# Patient Record
Sex: FEMALE | Race: BLACK OR AFRICAN AMERICAN | NOT HISPANIC OR LATINO | Employment: UNEMPLOYED | URBAN - METROPOLITAN AREA
[De-identification: names, ages, dates, MRNs, and addresses within clinical notes are randomized per-mention and may not be internally consistent; named-entity substitution may affect disease eponyms.]

---

## 2017-01-29 ENCOUNTER — HOSPITAL ENCOUNTER (EMERGENCY)
Facility: HOSPITAL | Age: 22
Discharge: LEFT BEFORE DISCHARGE | End: 2017-01-29
Payer: MEDICAID

## 2017-01-29 VITALS
HEIGHT: 61 IN | TEMPERATURE: 98 F | BODY MASS INDEX: 28.32 KG/M2 | RESPIRATION RATE: 16 BRPM | OXYGEN SATURATION: 100 % | DIASTOLIC BLOOD PRESSURE: 67 MMHG | HEART RATE: 76 BPM | SYSTOLIC BLOOD PRESSURE: 143 MMHG | WEIGHT: 150 LBS

## 2017-01-29 DIAGNOSIS — O26.899 VAGINAL DISCHARGE DURING PREGNANCY, UNSPECIFIED TRIMESTER: ICD-10-CM

## 2017-01-29 DIAGNOSIS — Z34.90 PREGNANCY, UNSPECIFIED GESTATIONAL AGE: Primary | ICD-10-CM

## 2017-01-29 DIAGNOSIS — O26.899 PELVIC PAIN IN PREGNANCY: ICD-10-CM

## 2017-01-29 DIAGNOSIS — N89.8 VAGINAL DISCHARGE DURING PREGNANCY, UNSPECIFIED TRIMESTER: ICD-10-CM

## 2017-01-29 DIAGNOSIS — R10.2 PELVIC PAIN IN PREGNANCY: ICD-10-CM

## 2017-01-29 LAB
B-HCG UR QL: POSITIVE
BACTERIA #/AREA URNS HPF: ABNORMAL /HPF
BILIRUB UR QL STRIP: NEGATIVE
CLARITY UR: CLEAR
COLOR UR: YELLOW
GLUCOSE UR QL STRIP: NEGATIVE
HGB UR QL STRIP: ABNORMAL
KETONES UR QL STRIP: NEGATIVE
LEUKOCYTE ESTERASE UR QL STRIP: ABNORMAL
MICROSCOPIC COMMENT: ABNORMAL
NITRITE UR QL STRIP: NEGATIVE
PH UR STRIP: 6 [PH] (ref 5–8)
PROT UR QL STRIP: NEGATIVE
RBC #/AREA URNS HPF: 0 /HPF (ref 0–4)
SP GR UR STRIP: 1.02 (ref 1–1.03)
SQUAMOUS #/AREA URNS HPF: 3 /HPF
URN SPEC COLLECT METH UR: ABNORMAL
UROBILINOGEN UR STRIP-ACNC: 1 EU/DL
WBC #/AREA URNS HPF: 75 /HPF (ref 0–5)

## 2017-01-29 PROCEDURE — 81025 URINE PREGNANCY TEST: CPT

## 2017-01-29 PROCEDURE — 81000 URINALYSIS NONAUTO W/SCOPE: CPT

## 2017-01-29 PROCEDURE — 99284 EMERGENCY DEPT VISIT MOD MDM: CPT

## 2017-01-29 NOTE — ED AVS SNAPSHOT
"          [facility]  [address] [facility] [address]               Madelyn Aguilar   2017  2:52 PM   ED    Description:  Female : 1995   Department:  [facility]            Your Care was Coordinated By:     Provider Role From To    Asael Mathur PA-C Physician Assistant [phone] --      Reason for Visit     Female  Problem           Diagnoses this Visit        Comments    Pregnancy, unspecified gestational age    -  Primary     Pelvic pain in pregnancy         Vaginal discharge during pregnancy, unspecified trimester           ED Disposition     ED Disposition Condition Comment    AMA  Date: 2017  Patient: Madelyn Aguilar  Admitted: 2017  2:52 PM  Attending Provider: Kasia att. providers found    Madelyn Aguilar or her authorized caregiver has made the decision for the patient to leave the emergency department against the  advice of Kareen. She or her authorized caregiver has been informed and understands the inherent risks, including death, ectopic pregnancy, spontaneous , PID, birth defect, etc.  She or her authorized caregiver has decided to accept the responsib ility for this decision. Madelyn Aguilar and all necessary parties have been advised that she may return for further evaluation or treatment. Her condition at time of discharge was stable.  Madelyn Aguilar had current vital signs as follows:  BP (! ) 143/67 (BP Location: Right arm, Patient Position: Sitting)  Pulse 76  Temp 98.3 °F (36.8 °C) (Oral)  Resp 16  Ht 5' 1" (1.549 m)  Wt 68 kg (150 lb)  LMP 2016 (Exact Date)  SpO2 100%  BMI 28.34 kg/m2             To Do List           Follow-up Information     Follow up with Norma Chen MD. Schedule an appointment as soon as possible for a visit in 1 day.    Specialty:  Obstetrics and Gynecology    Contact information:    [address] [facility] [address] [phone]          Follow up with " "[facility].    Specialty:  Emergency Medicine    Why:  If symptoms worsen in any way.     Contact information:    [address]  [facility] [address] [phone]      [facility]     [facility] Care [facility] - 24/7 Assistance  Registered nurses in the [facility] provide clinical advisement, health education, appointment booking, and other advisory services.  Call for this free service at [phone].             Medications           Message regarding Medications     Verify the changes and/or additions to your medication regime listed below are the same as discussed with your clinician today.  If any of these changes or additions are incorrect, please notify your healthcare provider.             Verify that the below list of medications is an accurate representation of the medications you are currently taking.  If none reported, the list may be blank. If incorrect, please contact your healthcare provider. Carry this list with you in case of emergency.                Clinical Reference Information           Your Vitals Were     BP Pulse Temp Resp Height Weight    143/67 (BP Location: Right arm, Patient Position: Sitting) 76 98.3 °F (36.8 °C) (Oral) 16 5' 1" (1.549 m) 68 kg (150 lb)    Last Period SpO2 BMI          12/23/2016 (Exact Date) 100% 28.34 kg/m2        Allergies as of 1/29/2017        Reactions    Fish Containing Products Anaphylaxis    Sudafed [Pseudoephedrine Hcl]       Immunizations Administered on Date of Encounter - 1/29/2017     None      ED Micro, Lab, POCT     Start Ordered       Status Ordering Provider    01/29/17 1454 01/29/17 1454    STAT,   Status:  Canceled      Canceled     01/29/17 1454 01/29/17 1454    STAT,   Status:  Canceled      Canceled     01/29/17 1454 01/29/17 1454    STAT,   Status:  Canceled      Canceled     01/29/17 1413 01/29/17 1412  Urinalysis  STAT      Final result     01/29/17 1413 01/29/17 1412  " Pregnancy, urine rapid (UPT)  Once      Final result     01/29/17 1412 01/29/17 1412  Urinalysis Microscopic  Once      Final result       ED Imaging Orders     None      Your Scheduled Appointments     Apr 10, 2017  3:15 PM CDT   New Gynecological with MD Betty Brisenochary - Obstetrics and Gynecology ([place])    [address] [place] [address] [address]  [place] [address] [phone]              [facility] Sign-Up     Activating your [facility] account is as easy as 1-2-3!     1) Visit [email or web address], select Sign Up Now, enter this activation code and your date of birth, then select Next.  [email or web address]  Expires: 3/15/2017  3:19 PM      2) Create a username and password to use when you visit [facility] in the future and select a security question in case you lose your password and select Next.    3) Enter your e-mail address and click Sign Up!    Additional Information  If you have questions, please e-mail [email or web address] or call [phone] to talk to our [facility] staff. Remember, [facility] is NOT to be used for urgent needs. For medical emergencies, dial 911.          [facility] complies with applicable Federal civil rights laws and does not discriminate on the basis of race, color, national origin, age, disability, or sex.        Language Assistance Services     ATTENTION: Language assistance services are available, free of charge. Please call [phone].      ATENCIÓN: Si habla español, tiene a michel disposición servicios gratuitos de asistencia lingüística. Llame al [phone].     [facility] Ý: N?u b?n nói Ti?ng Vi?t, có các d?ch v? h? tr? ngôn ng? mi?n phí dành cho b?n. G?i s? [phone].

## 2017-01-29 NOTE — ED PROVIDER NOTES
"Encounter Date: 2017       History       Chief Complaint   Patient presents with    Female  Problem     pt c/o lower abd pain, states cycle hasnt came on, and some discharge reported, denies bleeding. also reports her allergies are acting up      Review of patient's allergies indicates:   Allergen Reactions    Fish containing products Anaphylaxis    Sudafed [pseudoephedrine hcl]      HPI  Past Medical History   Diagnosis Date    Asthma     GERD (gastroesophageal reflux disease)      No past medical history pertinent negatives.  Past Surgical History   Procedure Laterality Date     section       No family history on file.  Social History   Substance Use Topics    Smoking status: Never Smoker    Smokeless tobacco: None    Alcohol use No     Patient presents to the ED complaining of lower abdominal pain which onset gradually today. Pt A0. Pt states her last menstrual cycle was at the end of last month. Pt reports recently stopping birth control. Pt reports taking 2 pregnancy tests yesterday, one resulting positive and one negative. Symptoms are constant and moderate in severity. The patient describes the sxs as "feeling like kidney stones". Pt states PMHx of kidney stones. No mitigating or exacerbating factors reported. Associated sxs include vaginal discharge. Patient denies any fever, chills, HA, dizziness, N/V, vaginal bleeding, pelvic pain, dysuria, hematuria, and all other sxs at this time. No further complaints or concerns at this time.     Review of Systems   Constitutional: Negative for chills and fever.   HENT: Negative for sore throat.    Respiratory: Negative for shortness of breath.    Cardiovascular: Negative for chest pain.   Gastrointestinal: Positive for abdominal pain. Negative for nausea and vomiting.   Genitourinary: Positive for vaginal discharge. Negative for dysuria, hematuria, pelvic pain and vaginal bleeding.   Musculoskeletal: Negative for back pain.   Skin: Negative " for rash.   Neurological: Negative for dizziness, weakness and headaches.   Hematological: Does not bruise/bleed easily.   All other systems reviewed and are negative.      Physical Exam   Initial Vitals   BP Pulse Resp Temp SpO2   01/29/17 1411 01/29/17 1411 01/29/17 1411 01/29/17 1411 01/29/17 1411   143/67 76 16 98.3 °F (36.8 °C) 100 %     Physical Exam  Vital signs and nursing notes reviewed.  Constitutional: Patient is in no acute distress. Awake and alert. Well-developed and well-nourished.  Head: Atraumatic. Normocephalic.  Eyes: PERRL. EOM intact. Conjunctivae nl. No scleral icterus.  ENT: Mucous membranes are moist. Oropharynx is clear.  Neck: Supple. Full ROM. No lymphadenopathy.  Cardiovascular: Regular rate and rhythm. No murmurs, rubs, or gallops. Distal pulses are 2+ and symmetric .  Pulmonary/Chest: No respiratory distress. Clear to auscultation bilaterally. No wheezing, rales, or rhonchi.  Abdominal: Soft. Non-distended. No TTP. No rebound, guarding, or rigidity. Good bowel sounds.  Genitourinary: No CVA tenderness  Musculoskeletal: Moves all extremities. No edema.  Skin: Warm and dry.  Neurological: Awake and alert. No acute focal neurological deficits are appreciated.  Psychiatric: Normal affect. Good eye contact. Appropriate in content.    ED Course   Procedures  Labs Reviewed   URINALYSIS - Abnormal; Notable for the following:        Result Value    Occult Blood UA Trace (*)     Leukocytes, UA 1+ (*)     All other components within normal limits   URINALYSIS MICROSCOPIC - Abnormal; Notable for the following:     WBC, UA 75 (*)     Bacteria, UA Few (*)     All other components within normal limits   PREGNANCY TEST, URINE RAPID     Results for orders placed or performed during the hospital encounter of 01/29/17   Urinalysis   Result Value Ref Range    Specimen UA Urine, Clean Catch     Color, UA Yellow Yellow, Straw, Karlie    Appearance, UA Clear Clear    pH, UA 6.0 5.0 - 8.0    Specific Gravity,  "UA 1.025 1.005 - 1.030    Protein, UA Negative Negative    Glucose, UA Negative Negative    Ketones, UA Negative Negative    Bilirubin (UA) Negative Negative    Occult Blood UA Trace (A) Negative    Nitrite, UA Negative Negative    Urobilinogen, UA 1.0 <2.0 EU/dL    Leukocytes, UA 1+ (A) Negative   Pregnancy, urine rapid (UPT)   Result Value Ref Range    Preg Test, Ur Positive    Urinalysis Microscopic   Result Value Ref Range    RBC, UA 0 0 - 4 /hpf    WBC, UA 75 (H) 0 - 5 /hpf    Bacteria, UA Few (A) None-Occ /hpf    Squam Epithel, UA 3 /hpf    Microscopic Comment SEE COMMENT                Medical Decision Making:   Initial Assessment:   Pt here c/o pelvic pain and late period, suspects pregnancy, positive home UPT   Clinical Tests:   Lab Tests: Ordered and Reviewed  ED Management:  Pt states that she is unwilling to stay in the ER for any blood tests, pelvic exam ,or imaging. She states that her pain "isn't that bad" and she has an appointment with her regular OB tomorrow and she prefers to wait until then. She signed out AMA. Risks discussed.          3:11 PM: Pt is A&O x3, appropriate and competent at this time. Pt voices desire to leave hospital. D/w pt in length need for further evaluation and treatment due to HPI and PEx. Pt declines any further evaluation or tx at this time. All risks, including worsening sx, permanent bodily harm and death, were discussed in length. Pt acknowledges all risk at this time and agrees to sign AMA form. Pt given RTER instructions. All questions and concerns addressed at this time. Pt leaving AMA.   Pt refused pelvic exam and further evaluation. Pt states she has an appointment with her PCP tomorrow and will have the exam performed then.      Pre-hypertension/Hypertension: The pt has been informed that they may have pre-hypertension or hypertension based on a blood pressure reading in the ED. I recommend that the pt call the PCP listed on their discharge instructions or a " physician of their choice this week to arrange f/u for further evaluation of possible pre-hypertension or hypertension.     I discussed with patient and/or family/caretaker that evaluation in the ED does not suggest any emergent or life threatening medical conditions requiring immediate intervention beyond what was provided in the ED, and I believe patient is safe for discharge.  Regardless, an unremarkable evaluation in the ED does not preclude the development or presence of a serious of life threatening condition. As such, patient was instructed to return immediately for any worsening or change in current symptoms.        ED Course     Clinical Impression:   The primary encounter diagnosis was Pregnancy, unspecified gestational age. Diagnoses of Pelvic pain in pregnancy and Vaginal discharge during pregnancy, unspecified trimester were also pertinent to this visit.    Disposition:   Disposition: AMA  Condition: Dwayne Mathur PA-C  01/29/17 1521

## 2017-02-06 ENCOUNTER — HOSPITAL ENCOUNTER (EMERGENCY)
Facility: HOSPITAL | Age: 22
Discharge: HOME | End: 2017-02-06
Attending: EMERGENCY MEDICINE
Payer: MEDICAID

## 2017-02-06 VITALS
TEMPERATURE: 99 F | HEIGHT: 62 IN | BODY MASS INDEX: 27.6 KG/M2 | WEIGHT: 150 LBS | HEART RATE: 77 BPM | SYSTOLIC BLOOD PRESSURE: 124 MMHG | RESPIRATION RATE: 17 BRPM | OXYGEN SATURATION: 99 % | DIASTOLIC BLOOD PRESSURE: 74 MMHG

## 2017-02-06 DIAGNOSIS — O20.0 THREATENED MISCARRIAGE: Primary | ICD-10-CM

## 2017-02-06 LAB
ABO GROUP BLD: NORMAL
ALBUMIN SERPL BCP-MCNC: 4 G/DL
ALP SERPL-CCNC: 68 U/L
ALT SERPL W/O P-5'-P-CCNC: 16 U/L
ANION GAP SERPL CALC-SCNC: 8 MMOL/L
AST SERPL-CCNC: 17 U/L
B-HCG UR QL: POSITIVE
BASOPHILS # BLD AUTO: 0.01 K/UL
BASOPHILS NFR BLD: 0.1 %
BILIRUB SERPL-MCNC: 0.6 MG/DL
BILIRUB UR QL STRIP: NEGATIVE
BLD GP AB SCN CELLS X3 SERPL QL: NORMAL
BUN SERPL-MCNC: 7 MG/DL
CALCIUM SERPL-MCNC: 9.7 MG/DL
CHLORIDE SERPL-SCNC: 105 MMOL/L
CLARITY UR: CLEAR
CO2 SERPL-SCNC: 26 MMOL/L
COLOR UR: YELLOW
CREAT SERPL-MCNC: 0.8 MG/DL
DIFFERENTIAL METHOD: ABNORMAL
EOSINOPHIL # BLD AUTO: 0.1 K/UL
EOSINOPHIL NFR BLD: 1.2 %
ERYTHROCYTE [DISTWIDTH] IN BLOOD BY AUTOMATED COUNT: 13 %
EST. GFR  (AFRICAN AMERICAN): >60 ML/MIN/1.73 M^2
EST. GFR  (NON AFRICAN AMERICAN): >60 ML/MIN/1.73 M^2
GLUCOSE SERPL-MCNC: 83 MG/DL
GLUCOSE UR QL STRIP: NEGATIVE
HCG INTACT+B SERPL-ACNC: 277 MIU/ML
HCT VFR BLD AUTO: 38.2 %
HGB BLD-MCNC: 12.5 G/DL
HGB UR QL STRIP: ABNORMAL
KETONES UR QL STRIP: NEGATIVE
LEUKOCYTE ESTERASE UR QL STRIP: NEGATIVE
LYMPHOCYTES # BLD AUTO: 3.6 K/UL
LYMPHOCYTES NFR BLD: 47.6 %
MCH RBC QN AUTO: 26.1 PG
MCHC RBC AUTO-ENTMCNC: 32.7 %
MCV RBC AUTO: 80 FL
MICROSCOPIC COMMENT: ABNORMAL
MONOCYTES # BLD AUTO: 0.6 K/UL
MONOCYTES NFR BLD: 8 %
NEUTROPHILS # BLD AUTO: 3.2 K/UL
NEUTROPHILS NFR BLD: 43.1 %
NITRITE UR QL STRIP: NEGATIVE
PH UR STRIP: 8 [PH] (ref 5–8)
PLATELET # BLD AUTO: 190 K/UL
PMV BLD AUTO: 11.9 FL
POTASSIUM SERPL-SCNC: 3.7 MMOL/L
PROT SERPL-MCNC: 8 G/DL
PROT UR QL STRIP: NEGATIVE
RBC # BLD AUTO: 4.79 M/UL
RBC #/AREA URNS HPF: 10 /HPF (ref 0–4)
RH BLD: NORMAL
SODIUM SERPL-SCNC: 139 MMOL/L
SP GR UR STRIP: 1.01 (ref 1–1.03)
URN SPEC COLLECT METH UR: ABNORMAL
UROBILINOGEN UR STRIP-ACNC: NEGATIVE EU/DL
WBC # BLD AUTO: 7.52 K/UL

## 2017-02-06 PROCEDURE — 85025 COMPLETE CBC W/AUTO DIFF WBC: CPT

## 2017-02-06 PROCEDURE — 81025 URINE PREGNANCY TEST: CPT

## 2017-02-06 PROCEDURE — 86850 RBC ANTIBODY SCREEN: CPT

## 2017-02-06 PROCEDURE — 81000 URINALYSIS NONAUTO W/SCOPE: CPT

## 2017-02-06 PROCEDURE — 84702 CHORIONIC GONADOTROPIN TEST: CPT

## 2017-02-06 PROCEDURE — 86900 BLOOD TYPING SEROLOGIC ABO: CPT

## 2017-02-06 PROCEDURE — 99284 EMERGENCY DEPT VISIT MOD MDM: CPT

## 2017-02-06 PROCEDURE — 86901 BLOOD TYPING SEROLOGIC RH(D): CPT

## 2017-02-06 PROCEDURE — 80053 COMPREHEN METABOLIC PANEL: CPT

## 2017-02-06 NOTE — ED AVS SNAPSHOT
"          [facility]  [address] [facility] [address]               Madelyn Aguilar   2017  9:55 AM   ED    Description:  Female : 1995   Department:  [facility]            Your Care was Coordinated By:     Provider Role From To    Eugenio Cage MD Attending Provider [phone] --      Reason for Visit     Vaginal Bleeding           Diagnoses this Visit        Comments    Threatened miscarriage    -  Primary       ED Disposition     ED Disposition Condition Comment    Discharge             To Do List           Follow-up Information     Follow up with OB/GYN.      [facility]     [facility] Care [facility] -  Assistance  Registered nurses in the [facility] provide clinical advisement, health education, appointment booking, and other advisory services.  Call for this free service at [phone].             Medications           Message regarding Medications     Verify the changes and/or additions to your medication regime listed below are the same as discussed with your clinician today.  If any of these changes or additions are incorrect, please notify your healthcare provider.             Verify that the below list of medications is an accurate representation of the medications you are currently taking.  If none reported, the list may be blank. If incorrect, please contact your healthcare provider. Carry this list with you in case of emergency.                Clinical Reference Information           Your Vitals Were     BP Pulse Temp Resp Height Weight    121/82 (BP Location: Right arm, Patient Position: Lying) 79 98.7 °F (37.1 °C) 18 5' 2" (1.575 m) 68 kg (150 lb)    Last Period SpO2 BMI          2016 (Exact Date) 100% 27.44 kg/m2        Allergies as of 2017        Reactions    Fish Containing Products Anaphylaxis    Sudafed [Pseudoephedrine Hcl]       Immunizations Administered on Date of Encounter - " 2017     None      ED Micro, Lab, POCT     Start Ordered       Status Ordering Provider    17 1005 17 1004  Urinalysis  STAT      Final result     17 1005 17 1004  Pregnancy, urine rapid  STAT      Final result     17 1005 17 1004  CBC auto differential  STAT      Final result     17 1005 17 1004  Comprehensive metabolic panel  STAT      Final result     17 1005 17 1004  hCG, quantitative  STAT      Final result     17 1005 17 1004  Vaginal Screen  STAT      Acknowledged     17 1005 17 1004  C. trachomatis/N. gonorrhoeae by AMP DNA  STAT      Acknowledged     17 1004 17 1004  Urinalysis Microscopic  Once      Final result       ED Imaging Orders     None        Discharge Instructions         Possible Miscarriage (Threatened )  You may be having a miscarriage.  Common signs of a miscarriage are pain and bleeding. A small amount of bleeding can be normal during the first 3 months of pregnancy. Often the pain and bleeding stop, and you have a normal pregnancy and baby. But heavy bleeding or severe cramping can be an early sign of miscarriage. A miscarriage means an unexpected loss of your pregnancy.  At this time, your healthcare provider doesnt know whether you will have a miscarriage, or if things will clear up and your pregnancy will continue normally. This can be emotionally difficult. There is little that can be done to change the way you feel. But understand that miscarriages are common.  About 1 or 2 out of every 10 pregnancies end this way. Some even end before you know you are pregnant. This happens for a number of reasons, and usually the cause is never known. Its important you know that it is not your fault. It didnt happen because you did anything wrong.  Having sex or exercising does not cause a miscarriage. These activities are usually safe unless you have pain or bleeding or your doctor tells  you to stop. Even minor falls wont cause a miscarriage. Miscarriages happen because things were not developing as they were supposed to. No medicine can prevent a miscarriage.  Again, understand that things are uncertain right now. You may still have some bleeding. This may be light spotting or like a period, and you may pass some tissue. You may have some cramping. This is why follow-up care is important.  Home care  To improve the chance of keeping your pregnancy, you should take these steps:  · Rest in bed until the pain and bleeding stop.  · Dont have sex until your healthcare provider says its OK.  · Use sanitary napkins instead of tampons.  · Dont douche.  · Dont take aspirin, ibuprofen, or naproxen.  · Dont have alcoholic or caffeinated beverages or smoke.  Follow-up care  Make an appointment with your doctor within the next week, or as directed.  If you had an ultrasound, a radiologist will review it. You will be told of any new findings that may affect your care.  Call 911  Call 911 if you have:  · Severe pain and very heavy bleeding  · Severe lightheadedness, passing out, or fainting  · Rapid heart rate  · Difficulty breathing  · Confusion or difficulty waking up  When to seek medical advice  Call your healthcare provider right away if any of these occur:  · Vaginal bleeding or pain that lasts for more than 3 days  · Heavy bleeding. This means soaking 1 new pad an hour over 3 hours.  · Fever of 100.4°F (38°C) or higher, or as directed by your healthcare provider  · Pain in your lower belly (abdomen) that gets worse  · Weakness or dizziness  · Passage of anything that resembles tissue. This would be pink or grayish membrane or solid material. Save the tissue in a clean container and bring it to your provider.  Date Last Reviewed: 9/1/2016  © 2370-7203 [employer]. [address] [place] [address], [place] [address]. All rights reserved. This information is not intended as a substitute for  professional medical care. Always follow your healthcare professional's instructions.          Your Scheduled Appointments     Feb 23, 2017  1:45 PM CST   Established Patient Visit with MD Betty Brisenochary - Obstetrics and Gynecology ([place])    [address] [place] [address] [address]  [place] [id number] [phone]              [facility] Sign-Up     Activating your [facility] account is as easy as 1-2-3!     1) Visit [email or web address], select Sign Up Now, enter this activation code and your date of birth, then select Next.  [email or web address]  Expires: 3/15/2017  3:19 PM      2) Create a username and password to use when you visit [facility] in the future and select a security question in case you lose your password and select Next.    3) Enter your e-mail address and click Sign Up!    Additional Information  If you have questions, please e-mail [email or web address] or call [phone] to talk to our [facility] staff. Remember, [facility] is NOT to be used for urgent needs. For medical emergencies, dial 911.          [facility] complies with applicable Federal civil rights laws and does not discriminate on the basis of race, color, national origin, age, disability, or sex.        Language Assistance Services     ATTENTION: Language assistance services are available, free of charge. Please call [phone].      ATENCIÓN: Si habla español, tiene a michel disposición servicios gratuitos de asistencia lingüística. Llame al [phone].     CHÚ Ý: N?u b?n nói Ti?ng Vi?t, có các d?ch v? h? tr? ngôn ng? mi?n phí dành cho b?n. G?i s? [phone].

## 2017-02-06 NOTE — DISCHARGE INSTRUCTIONS
Possible Miscarriage (Threatened )  You may be having a miscarriage.  Common signs of a miscarriage are pain and bleeding. A small amount of bleeding can be normal during the first 3 months of pregnancy. Often the pain and bleeding stop, and you have a normal pregnancy and baby. But heavy bleeding or severe cramping can be an early sign of miscarriage. A miscarriage means an unexpected loss of your pregnancy.  At this time, your healthcare provider doesnt know whether you will have a miscarriage, or if things will clear up and your pregnancy will continue normally. This can be emotionally difficult. There is little that can be done to change the way you feel. But understand that miscarriages are common.  About 1 or 2 out of every 10 pregnancies end this way. Some even end before you know you are pregnant. This happens for a number of reasons, and usually the cause is never known. Its important you know that it is not your fault. It didnt happen because you did anything wrong.  Having sex or exercising does not cause a miscarriage. These activities are usually safe unless you have pain or bleeding or your doctor tells you to stop. Even minor falls wont cause a miscarriage. Miscarriages happen because things were not developing as they were supposed to. No medicine can prevent a miscarriage.  Again, understand that things are uncertain right now. You may still have some bleeding. This may be light spotting or like a period, and you may pass some tissue. You may have some cramping. This is why follow-up care is important.  Home care  To improve the chance of keeping your pregnancy, you should take these steps:  · Rest in bed until the pain and bleeding stop.  · Dont have sex until your healthcare provider says its OK.  · Use sanitary napkins instead of tampons.  · Dont douche.  · Dont take aspirin, ibuprofen, or naproxen.  · Dont have alcoholic or caffeinated beverages or smoke.  Follow-up care  Make  an appointment with your doctor within the next week, or as directed.  If you had an ultrasound, a radiologist will review it. You will be told of any new findings that may affect your care.  Call 911  Call 911 if you have:  · Severe pain and very heavy bleeding  · Severe lightheadedness, passing out, or fainting  · Rapid heart rate  · Difficulty breathing  · Confusion or difficulty waking up  When to seek medical advice  Call your healthcare provider right away if any of these occur:  · Vaginal bleeding or pain that lasts for more than 3 days  · Heavy bleeding. This means soaking 1 new pad an hour over 3 hours.  · Fever of 100.4°F (38°C) or higher, or as directed by your healthcare provider  · Pain in your lower belly (abdomen) that gets worse  · Weakness or dizziness  · Passage of anything that resembles tissue. This would be pink or grayish membrane or solid material. Save the tissue in a clean container and bring it to your provider.  Date Last Reviewed: 9/1/2016  © 7719-5362 [employer]. [address] [place] [address], [place] [address]. All rights reserved. This information is not intended as a substitute for professional medical care. Always follow your healthcare professional's instructions.

## 2017-02-06 NOTE — ED PROVIDER NOTES
SCRIBE #1 NOTE: I, Cecy Sapp, am scribing for, and in the presence of, Eugenio Cage MD. I have scribed the entire note.      History      Chief Complaint   Patient presents with    Vaginal Bleeding     Reports being about 3wks preg with bleeding since last night and suprapubic pain.        Review of patient's allergies indicates:   Allergen Reactions    Fish containing products Anaphylaxis    Sudafed [pseudoephedrine hcl]         HPI   HPI    2017, 10:01 AM   History obtained from the patient      History of Present Illness: Madelyn Aguilar is a 21 y.o. female patient who presents to the Emergency Department for vaginal bleeding which onset gradually last night. Pt states that sx are similar to her menstrual cycles. Pt is currently 4 weeks pregnant. Sx have been episodic and moderate in severity. No modifying factors noted. Associated sx include suprapubic abdominal pain and back pain. Pt denies any fever, chills, n/v/d, constipation, dysuria, or vaginal discharge. No further complaints at this time.       Arrival mode: Personal vehicle      PCP: No primary care provider on file.       Past Medical History:  Past Medical History   Diagnosis Date    Asthma     GERD (gastroesophageal reflux disease)        Past Surgical History:  Past Surgical History   Procedure Laterality Date     section           Family History:  Reviewed. Not pertinent     Social History:  Social History     Social History Main Topics    Smoking status: Never Smoker    Smokeless tobacco: Unknown     Alcohol use No    Drug use: No    Sexual activity: Yes     Partners: Male       ROS   Review of Systems   Constitutional: Negative for chills, diaphoresis and fever.   HENT: Negative for sore throat.    Respiratory: Negative for shortness of breath.    Cardiovascular: Negative for chest pain.   Gastrointestinal: Positive for abdominal pain. Negative for blood in stool, constipation, diarrhea, nausea and vomiting.    Genitourinary: Positive for vaginal bleeding. Negative for dysuria, vaginal discharge and vaginal pain.   Musculoskeletal: Positive for back pain. Negative for neck pain and neck stiffness.   Skin: Negative for rash.   Neurological: Negative for dizziness, weakness, light-headedness, numbness and headaches.   Hematological: Does not bruise/bleed easily.       Physical Exam    Initial Vitals   BP Pulse Resp Temp SpO2   02/06/17 0952 02/06/17 0952 02/06/17 0952 02/06/17 0952 02/06/17 0952   138/84 110 18 98.2 °F (36.8 °C) 98 %      Physical Exam  Nursing Notes and Vital Signs Reviewed.  Constitutional: Patient is in no acute distress. Awake and alert. Well-developed and well-nourished.  Head: Atraumatic. Normocephalic.  Eyes: PERRL. EOM intact. Conjunctivae are not pale. No scleral icterus.  ENT: Mucous membranes are moist. Oropharynx is clear and symmetric.    Neck: Supple. Full ROM. No lymphadenopathy.  Cardiovascular: Regular rate. Regular rhythm. No murmurs, rubs, or gallops. Distal pulses are 2+ and symmetric.  Pulmonary/Chest: No respiratory distress. Clear to auscultation bilaterally. No wheezing, rales, or rhonchi.  Abdominal: Soft and non-distended.  There is no tenderness.  No rebound, guarding, or rigidity.    Pelvic: A female chaperone was present for this examination. Nl external inspection. No lesions or abnormalities were visible on the labia majora or minora. Cervical os is closed. There is no CMT. Mild blood in the vaginal vault. No discharge. No adnexal tenderness. No adnexal masses  Musculoskeletal: Moves all extremities. No obvious deformities.   Skin: Warm and dry.  Neurological:  Alert, awake, and appropriate.  Normal speech.  No acute focal neurological deficits are appreciated.  Psychiatric: Normal affect. Good eye contact. Appropriate in content.    ED Course    Procedures  ED Vital Signs:  Vitals:    02/06/17 0952 02/06/17 1155 02/06/17 1248   BP: 138/84 121/82 124/74   Pulse: 110 79 77  "  Resp: 18  17   Temp: 98.2 °F (36.8 °C) 98.7 °F (37.1 °C) 98.6 °F (37 °C)   TempSrc: Oral  Oral   SpO2: 98% 100% 99%   Weight: 68 kg (150 lb)     Height: 5' 2" (1.575 m)         Abnormal Lab Results:  Labs Reviewed   URINALYSIS - Abnormal; Notable for the following:        Result Value    Occult Blood UA 3+ (*)     All other components within normal limits   CBC W/ AUTO DIFFERENTIAL - Abnormal; Notable for the following:     MCV 80 (*)     MCH 26.1 (*)     All other components within normal limits   URINALYSIS MICROSCOPIC - Abnormal; Notable for the following:     RBC, UA 10 (*)     All other components within normal limits   PREGNANCY TEST, URINE RAPID   COMPREHENSIVE METABOLIC PANEL   HCG, QUANTITATIVE, PREGNANCY   GROUP & RH   TYPE & SCREEN   INDIRECT ANTIGLOBULIN TEST        All Lab Results:  Results for orders placed or performed during the hospital encounter of 02/06/17   Urinalysis   Result Value Ref Range    Specimen UA Urine, Clean Catch     Color, UA Yellow Yellow, Straw, Karlie    Appearance, UA Clear Clear    pH, UA 8.0 5.0 - 8.0    Specific Gravity, UA 1.010 1.005 - 1.030    Protein, UA Negative Negative    Glucose, UA Negative Negative    Ketones, UA Negative Negative    Bilirubin (UA) Negative Negative    Occult Blood UA 3+ (A) Negative    Nitrite, UA Negative Negative    Urobilinogen, UA Negative <2.0 EU/dL    Leukocytes, UA Negative Negative   Pregnancy, urine rapid   Result Value Ref Range    Preg Test, Ur Positive    CBC auto differential   Result Value Ref Range    WBC 7.52 3.90 - 12.70 K/uL    RBC 4.79 4.00 - 5.40 M/uL    Hemoglobin 12.5 12.0 - 16.0 g/dL    Hematocrit 38.2 37.0 - 48.5 %    MCV 80 (L) 82 - 98 fL    MCH 26.1 (L) 27.0 - 31.0 pg    MCHC 32.7 32.0 - 36.0 %    RDW 13.0 11.5 - 14.5 %    Platelets 190 150 - 350 K/uL    MPV 11.9 9.2 - 12.9 fL    Gran # 3.2 1.8 - 7.7 K/uL    Lymph # 3.6 1.0 - 4.8 K/uL    Mono # 0.6 0.3 - 1.0 K/uL    Eos # 0.1 0.0 - 0.5 K/uL    Baso # 0.01 0.00 - 0.20 K/uL "    Gran% 43.1 38.0 - 73.0 %    Lymph% 47.6 18.0 - 48.0 %    Mono% 8.0 4.0 - 15.0 %    Eosinophil% 1.2 0.0 - 8.0 %    Basophil% 0.1 0.0 - 1.9 %    Differential Method Automated    Comprehensive metabolic panel   Result Value Ref Range    Sodium 139 136 - 145 mmol/L    Potassium 3.7 3.5 - 5.1 mmol/L    Chloride 105 95 - 110 mmol/L    CO2 26 23 - 29 mmol/L    Glucose 83 70 - 110 mg/dL    BUN, Bld 7 6 - 20 mg/dL    Creatinine 0.8 0.5 - 1.4 mg/dL    Calcium 9.7 8.7 - 10.5 mg/dL    Total Protein 8.0 6.0 - 8.4 g/dL    Albumin 4.0 3.5 - 5.2 g/dL    Total Bilirubin 0.6 0.1 - 1.0 mg/dL    Alkaline Phosphatase 68 55 - 135 U/L    AST 17 10 - 40 U/L    ALT 16 10 - 44 U/L    Anion Gap 8 8 - 16 mmol/L    eGFR if African American >60 >60 mL/min/1.73 m^2    eGFR if non African American >60 >60 mL/min/1.73 m^2   hCG, quantitative   Result Value Ref Range    hCG Quant 277 See Text mIU/mL   Urinalysis Microscopic   Result Value Ref Range    RBC, UA 10 (H) 0 - 4 /hpf    Microscopic Comment SEE COMMENT    Group & Rh   Result Value Ref Range    ABO O     Rh Type POS        Imaging Results:  Imaging Results     None                  The Emergency Provider reviewed the vital signs and test results, which are outlined above.    ED Discussion     12:38 PM: Reassessed pt at this time. Instructed pt to f/u with OB/GYN in 2 days for a repeat beta hCG. Discussed with pt all pertinent ED information and results. Discussed pt dx  and plan of tx. Gave pt all f/u and return to the ED instructions. All questions and concerns were addressed at this time. Pt expresses understanding of information and instructions, and is comfortable with plan to discharge. Pt is stable for discharge.      There are no discharge medications for this patient.      Follow-up Information     Follow up with OB/GYN.            Medical Decision Making    Medical Decision Making:   Clinical Tests:   Lab Tests: Ordered and Reviewed           Scribe Attestation:   Scribe #1: I  performed the above scribed service and the documentation accurately describes the services I performed. I attest to the accuracy of the note.    Attending:   Physician Attestation Statement for Scribe #1: I, Eugenio Cage MD, personally performed the services described in this documentation, as scribed by Cecy Sapp, in my presence, and it is both accurate and complete.          Clinical Impression       ICD-10-CM ICD-9-CM   1. Threatened miscarriage O20.0 640.00       Disposition:   Disposition: Discharged  Condition: Stable         Eugenio Cage MD  02/06/17 0515

## 2017-02-07 ENCOUNTER — TELEPHONE (OUTPATIENT)
Dept: OBSTETRICS AND GYNECOLOGY | Facility: CLINIC | Age: 22
End: 2017-02-07

## 2017-02-07 ENCOUNTER — NURSE TRIAGE (OUTPATIENT)
Dept: ADMINISTRATIVE | Facility: CLINIC | Age: 22
End: 2017-02-07

## 2017-02-07 NOTE — TELEPHONE ENCOUNTER
----- Message from Krishna Noyola sent at 2/7/2017  9:45 AM CST -----  Contact: Pt  Pt request call from nurse regarding threating  a miscarriage and wants to discuss, please contact pt at [phone]

## 2017-02-07 NOTE — TELEPHONE ENCOUNTER
Reason for Disposition   MILD vaginal bleeding (i.e., clots or similar to menstrual period; not just spotting)    Protocols used: ST PREGNANCY - VAGINAL BLEEDING LESS THAN 20 WEEKS EGA-A-OH    Pt complaining of vaginal bleeding and is 4 weeks pregnant. Denies any pain Pt was seen in the ER yesterday for the same thing- pt reports that nothing has changed since the visit. Pt was calling to find out about follow up- advised to follow up with OB tomorrow

## 2017-02-13 ENCOUNTER — LAB VISIT (OUTPATIENT)
Dept: LAB | Facility: HOSPITAL | Age: 22
End: 2017-02-13
Attending: OBSTETRICS & GYNECOLOGY
Payer: MEDICAID

## 2017-02-13 ENCOUNTER — OFFICE VISIT (OUTPATIENT)
Dept: OBSTETRICS AND GYNECOLOGY | Facility: CLINIC | Age: 22
End: 2017-02-13
Payer: MEDICAID

## 2017-02-13 ENCOUNTER — TELEPHONE (OUTPATIENT)
Dept: OBSTETRICS AND GYNECOLOGY | Facility: CLINIC | Age: 22
End: 2017-02-13

## 2017-02-13 VITALS
SYSTOLIC BLOOD PRESSURE: 138 MMHG | BODY MASS INDEX: 27.12 KG/M2 | WEIGHT: 147.38 LBS | HEIGHT: 62 IN | DIASTOLIC BLOOD PRESSURE: 84 MMHG

## 2017-02-13 DIAGNOSIS — O02.1 MISSED ABORTION: ICD-10-CM

## 2017-02-13 DIAGNOSIS — O02.1 MISSED ABORTION: Primary | ICD-10-CM

## 2017-02-13 LAB — HCG INTACT+B SERPL-ACNC: 27 MIU/ML

## 2017-02-13 PROCEDURE — 36415 COLL VENOUS BLD VENIPUNCTURE: CPT | Mod: PO

## 2017-02-13 PROCEDURE — 99213 OFFICE O/P EST LOW 20 MIN: CPT | Mod: TH,S$PBB,, | Performed by: OBSTETRICS & GYNECOLOGY

## 2017-02-13 PROCEDURE — 84702 CHORIONIC GONADOTROPIN TEST: CPT

## 2017-02-13 PROCEDURE — 99999 PR PBB SHADOW E&M-EST. PATIENT-LVL II: CPT | Mod: PBBFAC,,, | Performed by: OBSTETRICS & GYNECOLOGY

## 2017-02-13 RX ORDER — ACYCLOVIR 400 MG/1
TABLET ORAL
COMMUNITY
Start: 2015-11-12 | End: 2017-12-20 | Stop reason: SDUPTHER

## 2017-02-13 NOTE — TELEPHONE ENCOUNTER
----- Message from Valeri Abraham sent at 2/13/2017  2:55 PM CST -----  Contact: pt  Pt is requesting an order for an ultrasound. Pls call pt back at [phone]

## 2017-02-13 NOTE — PROGRESS NOTES
Subjective:       Patient ID: Madelyn Aguilar is a 21 y.o. female.    Chief Complaint:  Follow-up and Possible Pregnancy      History of Present Illness  HPI  here for f/u   Had bhcg 277; 2/6--noticed heavy bleeding last week; bleeding stopped yesterday; reports pregancy was unexpected but was happy--emotionally ok; menses late in january    GYN & OB History  Patient's last menstrual period was 2016 (exact date).   Date of Last Pap: No result found    OB History    Para Term  AB SAB TAB Ectopic Multiple Living   2 1 1 0 0 0 0 0 0 1      # Outcome Date GA Lbr Dusty/2nd Weight Sex Delivery Anes PTL Lv   2             1 Term                   Review of Systems  Review of Systems   Constitutional: Negative for activity change, appetite change, chills, diaphoresis, fatigue, fever and unexpected weight change.   HENT: Negative for mouth sores and tinnitus.    Eyes: Negative for discharge and visual disturbance.   Respiratory: Negative for cough, shortness of breath and wheezing.    Cardiovascular: Negative for chest pain, palpitations and leg swelling.   Gastrointestinal: Negative for abdominal pain, bloating, blood in stool, constipation, diarrhea, nausea and vomiting.   Endocrine: Negative for diabetes, hair loss, hot flashes, hyperthyroidism and hypothyroidism.   Genitourinary: Positive for menstrual problem (+upt, bleeding). Negative for decreased libido, dyspareunia, dysuria, flank pain, frequency, genital sores, hematuria, menorrhagia, pelvic pain, urgency, vaginal bleeding, vaginal discharge, vaginal pain, dysmenorrhea, urinary incontinence, postcoital bleeding, postmenopausal bleeding and vaginal odor.   Musculoskeletal: Negative for back pain and myalgias.   Skin:  Negative for rash, no acne and hair changes.   Neurological: Negative for seizures, syncope, numbness and headaches.   Hematological: Negative for adenopathy. Does not bruise/bleed easily.   Psychiatric/Behavioral:  Negative for depression and sleep disturbance. The patient is not nervous/anxious.    Breast: Negative for breast mass, breast pain, nipple discharge and skin changes          Objective:    Physical Exam:   Constitutional: She appears well-developed.     Eyes: Conjunctivae and EOM are normal. Pupils are equal, round, and reactive to light.    Neck: Normal range of motion. Neck supple.     Pulmonary/Chest: Effort normal.        Abdominal: Soft.             Musculoskeletal: Normal range of motion.       Neurological: She is alert.    Skin: Skin is warm.    Psychiatric: She has a normal mood and affect.          Assessment:      irreg bleeding  Threatened           Plan:      bhcg today  sono indicated depending on test results  Pt aware if lower, suspect completed ab--pt would want to resume contraception

## 2017-02-13 NOTE — MR AVS SNAPSHOT
"    [facility] Obstetrics [facility] Gyne[facility]  [address]  [place] [address]  Phone: [phone]                  Madelyn Aguilar   2017 1:15 PM   Office Visit    Description:  Female : 1995   Provider:  Norma Chen MD   Department:  [facility] Obstetrics and Gynecology           Reason for Visit     Follow-up     Possible Pregnancy           Diagnoses this Visit        Comments    Missed     -  Primary            To Do List           Future Appointments        Provider Department Dept Phone    2017 2:30 PM LABORATORY, [facility]     3/7/2017 4:45 PM Norma Chen MD [facility] [phone]      Goals (5 Years of Data)     None      [facility] On Call     [facility] Nurse Care Line -  Assistance  Registered nurses in the [facility] provide clinical advisement, health education, appointment booking, and other advisory services.  Call for this free service at [phone].             Medications           Message regarding Medications     Verify the changes and/or additions to your medication regime listed below are the same as discussed with your clinician today.  If any of these changes or additions are incorrect, please notify your healthcare provider.             Verify that the below list of medications is an accurate representation of the medications you are currently taking.  If none reported, the list may be blank. If incorrect, please contact your healthcare provider. Carry this list with you in case of emergency.           Current Medications     acyclovir (ZOVIRAX) 400 MG tablet            Clinical Reference Information           Your Vitals Were     BP Height Weight Last Period BMI    138/84 5' 2" (1.575 m) 66.8 kg (147 lb 6 oz) 2016 (Exact Date) 26.96 kg/m2      Blood Pressure          Most Recent Value    BP  138/84      Allergies as of 2017     Fish Containing Products    " Sudafed [Pseudoephedrine Hcl]      Immunizations Administered on Date of Encounter - 2/13/2017     None      Orders Placed During Today's Visit     Future Labs/Procedures Expected by Expires    hCG, quantitative  2/13/2017 4/14/2018      [facility] Sign-Up     Activating your [facility] account is as easy as 1-2-3!     1) Visit [email or web address], select Sign Up Now, enter this activation code and your date of birth, then select Next.  [email or web address]  Expires: 3/15/2017  3:19 PM      2) Create a username and password to use when you visit [facility] in the future and select a security question in case you lose your password and select Next.    3) Enter your e-mail address and click Sign Up!    Additional Information  If you have questions, please e-mail [email or web address] or call [phone] to talk to our [facility] staff. Remember, [facility] is NOT to be used for urgent needs. For medical emergencies, dial 911.         Language Assistance Services     ATTENTION: Language assistance services are available, free of charge. Please call [phone].      ATENCIÓN: Si habla español, tiene a michel disposición servicios gratuitos de asistencia lingüística. Llame al [phone].     CHÚ Ý: N?u b?n nói Ti?ng Vi?t, có các d?ch v? h? tr? ngôn ng? mi?n phí dành cho b?n. G?i s? [phone].         [facility] complies with applicable Federal civil rights laws and does not discriminate on the basis of race, color, national origin, age, disability, or sex.

## 2017-02-14 ENCOUNTER — TELEPHONE (OUTPATIENT)
Dept: OBSTETRICS AND GYNECOLOGY | Facility: CLINIC | Age: 22
End: 2017-02-14

## 2017-02-14 DIAGNOSIS — Z30.011 ENCOUNTER FOR INITIAL PRESCRIPTION OF CONTRACEPTIVE PILLS: Primary | ICD-10-CM

## 2017-02-14 RX ORDER — NORGESTIMATE AND ETHINYL ESTRADIOL 7DAYSX3 28
1 KIT ORAL DAILY
Qty: 28 TABLET | Refills: 5 | Status: SHIPPED | OUTPATIENT
Start: 2017-02-14 | End: 2017-08-08 | Stop reason: SDUPTHER

## 2017-02-14 NOTE — TELEPHONE ENCOUNTER
Called to advise that her hcg letter is 27 which means that she has/is miscarrying. Pt requests to resume birth control using Trinessa. Forwarded to Norma Chen for further assistance.  CHALO Green

## 2017-02-14 NOTE — TELEPHONE ENCOUNTER
----- Message from Nicky Martin sent at 2/14/2017 11:00 AM CST -----  Please call pt back at [phone] about test results.

## 2017-02-14 NOTE — TELEPHONE ENCOUNTER
Please advise her hormone level of pregnancy has decreased to 27; unfortunately, she did have a miscarriage.    Does she want to resume birth control?

## 2017-02-17 ENCOUNTER — TELEPHONE (OUTPATIENT)
Dept: OBSTETRICS AND GYNECOLOGY | Facility: CLINIC | Age: 22
End: 2017-02-17

## 2017-03-07 ENCOUNTER — OFFICE VISIT (OUTPATIENT)
Dept: OBSTETRICS AND GYNECOLOGY | Facility: CLINIC | Age: 22
End: 2017-03-07
Payer: MEDICAID

## 2017-03-07 VITALS — BODY MASS INDEX: 28.39 KG/M2 | HEIGHT: 62 IN | WEIGHT: 154.31 LBS

## 2017-03-07 DIAGNOSIS — N76.0 BACTERIAL VAGINOSIS: ICD-10-CM

## 2017-03-07 DIAGNOSIS — N30.00 ACUTE CYSTITIS WITHOUT HEMATURIA: ICD-10-CM

## 2017-03-07 DIAGNOSIS — B96.89 BACTERIAL VAGINOSIS: ICD-10-CM

## 2017-03-07 DIAGNOSIS — N89.8 VAGINAL DISCHARGE: Primary | ICD-10-CM

## 2017-03-07 DIAGNOSIS — Z11.3 SCREENING FOR GONORRHEA: ICD-10-CM

## 2017-03-07 PROCEDURE — 87591 N.GONORRHOEAE DNA AMP PROB: CPT

## 2017-03-07 PROCEDURE — 99214 OFFICE O/P EST MOD 30 MIN: CPT | Mod: S$PBB,,, | Performed by: OBSTETRICS & GYNECOLOGY

## 2017-03-07 PROCEDURE — 99213 OFFICE O/P EST LOW 20 MIN: CPT | Mod: PBBFAC,PN | Performed by: OBSTETRICS & GYNECOLOGY

## 2017-03-07 PROCEDURE — 99999 PR PBB SHADOW E&M-EST. PATIENT-LVL III: CPT | Mod: PBBFAC,,, | Performed by: OBSTETRICS & GYNECOLOGY

## 2017-03-07 PROCEDURE — 87480 CANDIDA DNA DIR PROBE: CPT

## 2017-03-07 RX ORDER — METRONIDAZOLE 500 MG/1
TABLET ORAL
Qty: 4 TABLET | Refills: 0 | Status: SHIPPED | OUTPATIENT
Start: 2017-03-07 | End: 2018-02-12

## 2017-03-07 NOTE — MR AVS SNAPSHOT
[facility] Obstetrics and Gynecology  [address]  [place] [address]  Phone: [phone]                  Madelyn Aguilar   3/7/2017 4:45 PM   Office Visit    Description:  Female : 1995   Provider:  Norma Chen MD   Department:  [facility] Obstetrics and Gynecology           Reason for Visit     Follow-up           Diagnoses this Visit        Comments    Acute cystitis without hematuria    -  Primary     Screening for gonorrhea         Bacterial vaginosis                To Do List           Future Appointments        Provider Department Dept Phone    3/8/2017 10:10 AM LABORATORY, [facility] (5 Years of Data)     None       These Medications        Disp Refills Start End    metronidazole (FLAGYL) 500 MG tablet 4 tablet 0 3/7/2017     4 tablets at one time    Pharmacy: [facility]/pharm[facility] #[id number] - [place], [place] - [address] AT [address] Ph #: [phone]         [facility]     [facility] Nurse Care Line -  Assistance  Registered nurses in the [facility] provide clinical advisement, health education, appointment booking, and other advisory services.  Call for this free service at [phone].             Medications           Message regarding Medications     Verify the changes and/or additions to your medication regime listed below are the same as discussed with your clinician today.  If any of these changes or additions are incorrect, please notify your healthcare provider.        START taking these NEW medications        Refills    metronidazole (FLAGYL) 500 MG tablet 0    Si tablets at one time    Class: Normal           Verify that the below list of medications is an accurate representation of the medications you are currently taking.  If none reported, the list may be blank. If incorrect, please contact your healthcare provider. Carry this list with you in case of  "emergency.           Current Medications     acyclovir (ZOVIRAX) 400 MG tablet     norgestimate-ethinyl estradiol (ORTHO TRI-CYCLEN,TRI-SPRINTEC) 0.18/0.215/0.25 mg-35 mcg (28) tablet Take 1 tablet by mouth once daily.    metronidazole (FLAGYL) 500 MG tablet 4 tablets at one time           Clinical Reference Information           Your Vitals Were     Height Weight BMI          5' 2" (1.575 m) 70 kg (154 lb 5.2 oz) 28.23 kg/m2        Allergies as of 3/7/2017     Fish Containing Products    Sudafed [Pseudoephedrine Hcl]      Immunizations Administered on Date of Encounter - 3/7/2017     None      Orders Placed During Today's Visit      Normal Orders This Visit    C. trachomatis/N. gonorrhoeae by AMP DNA Cervicovaginal     Vaginosis Screen by DNA Probe     Future Labs/Procedures Expected by Expires    Urine culture  3/7/2017 5/6/2018      Language Assistance Services     ATTENTION: Language assistance services are available, free of charge. Please call [phone].      ATENCIÓN: Si roberto carlos cummings, tiene a michel disposición servicios gratuitos de asistencia lingüística. Llame al [phone].     ROXANE Ý: N?u b?n nói Ti?ng Vi?t, có các d?ch v? h? tr? ngôn ng? mi?n phí dành cho b?n. G?i s? [phone].         [place] [facility] complies with applicable Federal civil rights laws and does not discriminate on the basis of race, color, national origin, age, disability, or sex.        "

## 2017-03-07 NOTE — PROGRESS NOTES
Subjective:       Patient ID: Madelyn Aguilar is a 21 y.o. female.    Chief Complaint:  Follow-up      History of Present Illness  HPI  Here for problem  C/o vaginal discharge with odor; also c/o odor to urine  Partner was told that he tested positive for trichomonas    GYN & OB History  No LMP recorded.   Date of Last Pap: No result found    OB History    Para Term  AB SAB TAB Ectopic Multiple Living   2 1 1 0 0 0 0 0 0 1      # Outcome Date GA Lbr Dusty/2nd Weight Sex Delivery Anes PTL Lv   2             1 Term                   Review of Systems  Review of Systems   Constitutional: Negative for activity change, appetite change, chills, diaphoresis, fatigue, fever and unexpected weight change.   HENT: Negative for mouth sores and tinnitus.    Eyes: Negative for discharge and visual disturbance.   Respiratory: Negative for cough, shortness of breath and wheezing.    Cardiovascular: Negative for chest pain, palpitations and leg swelling.   Gastrointestinal: Negative for abdominal pain, bloating, blood in stool, constipation, diarrhea, nausea and vomiting.   Endocrine: Negative for diabetes, hair loss, hot flashes, hyperthyroidism and hypothyroidism.   Genitourinary: Positive for vaginal discharge and vaginal odor. Negative for decreased libido, dyspareunia, dysuria, flank pain, frequency, genital sores, hematuria, menorrhagia, menstrual problem, pelvic pain, urgency, vaginal bleeding, vaginal pain, dysmenorrhea, urinary incontinence, postcoital bleeding and postmenopausal bleeding.   Musculoskeletal: Negative for back pain and myalgias.   Skin:  Negative for rash, no acne and hair changes.   Neurological: Negative for seizures, syncope, numbness and headaches.   Hematological: Negative for adenopathy. Does not bruise/bleed easily.   Psychiatric/Behavioral: Negative for depression and sleep disturbance. The patient is not nervous/anxious.    Breast: Negative for breast mass, breast pain,  nipple discharge and skin changes          Objective:    Physical Exam:   Constitutional: She appears well-developed.     Eyes: Conjunctivae and EOM are normal. Pupils are equal, round, and reactive to light.    Neck: Normal range of motion. Neck supple.     Pulmonary/Chest: Effort normal. Right breast exhibits no mass, no nipple discharge, no skin change, no tenderness and presence. Left breast exhibits no mass, no nipple discharge, no skin change, no tenderness and presence. Breasts are symmetrical.        Abdominal: Soft.     Genitourinary: Rectum normal and uterus normal. Pelvic exam was performed with patient supine. Cervix is normal. Right adnexum displays no mass. Left adnexum displays no mass. Vaginal discharge found. Labial bartholins normal.       Uterus Size: 6 cm   Musculoskeletal: Normal range of motion.       Neurological: She is alert.    Skin: Skin is warm.    Psychiatric: She has a normal mood and affect.          Assessment:     Vaginal discharge          Plan:      Gc/ct/affirm today  Safe sex  rx sent for flagyl for trichomonas  ucx orderd

## 2017-03-08 ENCOUNTER — LAB VISIT (OUTPATIENT)
Dept: LAB | Facility: HOSPITAL | Age: 22
End: 2017-03-08
Attending: OBSTETRICS & GYNECOLOGY
Payer: MEDICAID

## 2017-03-08 DIAGNOSIS — N30.00 ACUTE CYSTITIS WITHOUT HEMATURIA: ICD-10-CM

## 2017-03-08 PROCEDURE — 87077 CULTURE AEROBIC IDENTIFY: CPT

## 2017-03-08 PROCEDURE — 87088 URINE BACTERIA CULTURE: CPT

## 2017-03-08 PROCEDURE — 87086 URINE CULTURE/COLONY COUNT: CPT

## 2017-03-08 PROCEDURE — 87186 SC STD MICRODIL/AGAR DIL: CPT

## 2017-03-09 LAB
C TRACH DNA SPEC QL NAA+PROBE: NEGATIVE
CANDIDA RRNA VAG QL PROBE: NEGATIVE
G VAGINALIS RRNA GENITAL QL PROBE: POSITIVE
N GONORRHOEA DNA SPEC QL NAA+PROBE: NEGATIVE
T VAGINALIS RRNA GENITAL QL PROBE: NEGATIVE

## 2017-03-11 ENCOUNTER — PATIENT MESSAGE (OUTPATIENT)
Dept: OBSTETRICS AND GYNECOLOGY | Facility: CLINIC | Age: 22
End: 2017-03-11

## 2017-03-11 DIAGNOSIS — N30.00 ACUTE CYSTITIS WITHOUT HEMATURIA: Primary | ICD-10-CM

## 2017-03-11 LAB — BACTERIA UR CULT: NORMAL

## 2017-03-11 RX ORDER — CIPROFLOXACIN 500 MG/1
500 TABLET ORAL 2 TIMES DAILY
Qty: 10 TABLET | Refills: 0 | Status: SHIPPED | OUTPATIENT
Start: 2017-03-11 | End: 2017-03-16

## 2017-08-08 DIAGNOSIS — Z30.011 ENCOUNTER FOR INITIAL PRESCRIPTION OF CONTRACEPTIVE PILLS: ICD-10-CM

## 2017-08-08 RX ORDER — NORGESTIMATE AND ETHINYL ESTRADIOL 7DAYSX3 28
1 KIT ORAL DAILY
Qty: 28 TABLET | Refills: 5 | Status: SHIPPED | OUTPATIENT
Start: 2017-08-08 | End: 2018-02-12 | Stop reason: SDUPTHER

## 2017-08-24 ENCOUNTER — HOSPITAL ENCOUNTER (EMERGENCY)
Facility: HOSPITAL | Age: 22
Discharge: HOME | End: 2017-08-24
Attending: EMERGENCY MEDICINE
Payer: MEDICAID

## 2017-08-24 VITALS
SYSTOLIC BLOOD PRESSURE: 148 MMHG | HEIGHT: 62 IN | OXYGEN SATURATION: 100 % | TEMPERATURE: 99 F | HEART RATE: 93 BPM | BODY MASS INDEX: 27.42 KG/M2 | RESPIRATION RATE: 18 BRPM | DIASTOLIC BLOOD PRESSURE: 71 MMHG | WEIGHT: 149 LBS

## 2017-08-24 DIAGNOSIS — H10.12 ACUTE ALLERGIC CONJUNCTIVITIS OF LEFT EYE: Primary | ICD-10-CM

## 2017-08-24 PROCEDURE — 99283 EMERGENCY DEPT VISIT LOW MDM: CPT

## 2017-08-25 NOTE — ED PROVIDER NOTES
"SCRIBE #1 NOTE: I, Mario Jennings, am scribing for, and in the presence of, Norman Ricks MD. I have scribed the entire note.      History      Chief Complaint   Patient presents with    Eye Problem     Red and itchy; "since the day of the eclipse, but it might just be allergies".       Review of patient's allergies indicates:   Allergen Reactions    Fish containing products Anaphylaxis    Sudafed [pseudoephedrine hcl] Other (See Comments)     Tight knot on her neck        HPI   HPI    2017, 7:43 PM   History obtained from the patient      History of Present Illness: Madelyn Aguilar is a 22 y.o. female patient who presents to the Emergency Department for left eye itching and discharge which onset gradually a couple of days ago. Pt states that it "feels like an allergic reaction." Symptoms are constant and moderate in severity.  No mitigating or exacerbating factors reported. Associated sxs include left eye redness and blurred vision to the left eye. Prior tx includes benadryl (x1) taken last PM. Patient denies any headache, cough, sore throat, fever, chills, diaphoresis, and all other sxs at this time. No further complaints or concerns at this time.         Arrival mode: Personal vehicle    PCP: [facility]       Past Medical History:  Past Medical History:   Diagnosis Date    Anemia     GERD (gastroesophageal reflux disease)     Herpes simplex virus (HSV) infection        Past Surgical History:  Past Surgical History:   Procedure Laterality Date     SECTION           Family History:  Family History   Problem Relation Age of Onset    Hypertension Mother     Stroke Mother     Heart attack Mother        Social History:  Social History     Social History Main Topics    Smoking status: Never Smoker    Smokeless tobacco: Unknown    Alcohol use No    Drug use: No    Sexual activity: Yes     Partners: Male       ROS   Review of Systems   Constitutional: Negative for " "chills, diaphoresis and fever.   Eyes: Positive for discharge, redness, itching and visual disturbance (blurred).   Respiratory: Negative for shortness of breath.    Gastrointestinal: Negative for abdominal pain, diarrhea, nausea and vomiting.   Genitourinary: Negative for difficulty urinating, dysuria, frequency, hematuria and urgency.   Musculoskeletal: Negative for back pain.   Skin: Negative for rash.   Neurological: Negative for dizziness, syncope, weakness, light-headedness, numbness and headaches.   All other systems reviewed and are negative.      Physical Exam      Initial Vitals [08/24/17 1925]   BP Pulse Resp Temp SpO2   (!) 148/71 93 18 98.7 °F (37.1 °C) 100 %      MAP       96.67          Physical Exam  Nursing Notes and Vital Signs Reviewed.  Constitutional: Patient is in no apparent distress. Well-developed and well-nourished.  Head: Atraumatic. Normocephalic.  Eyes: PERRL. EOM intact. Conjunctivae are not pale. No scleral icterus. Right eye is normal. Minimal erythema to conjunctiva of left eye, with minimal discharge noted. Cornea is clear. No sign of foreign body or obvious abrasion.   ENT: Mucous membranes are moist.   Neck: Supple. Full ROM. No lymphadenopathy.  Cardiovascular: Regular rate. Regular rhythm.   Pulmonary/Chest: No respiratory distress. Chest is non-tender  Abdominal: Soft and non-distended. ABd is non-tender  Musculoskeletal: Moves all extremities. No obvious deformities. No edema.   Skin: Warm and dry.  Neurological:  Alert, awake, and appropriate.  Normal speech.  No acute focal neurological deficits are appreciated.  Psychiatric: Normal affect. Good eye contact. Appropriate in content.    ED Course    Procedures  ED Vital Signs:  Vitals:    08/24/17 1925   BP: (!) 148/71   Pulse: 93   Resp: 18   Temp: 98.7 °F (37.1 °C)   TempSrc: Oral   SpO2: 100%   Weight: 67.6 kg (149 lb)   Height: 5' 2" (1.575 m)                The Emergency Provider reviewed the vital signs and test results, " which are outlined above.    ED Discussion     7:47 PM:  Discussed with pt all pertinent ED information and results. Discussed pt dx and plan of tx. Gave pt all f/u and return to the ED instructions. All questions and concerns were addressed at this time. Pt expresses understanding of information and instructions, and is comfortable with plan to discharge. Pt is stable for discharge.    I discussed with patient and/or family/caretaker that evaluation in the ED does not suggest any emergent or life threatening medical conditions requiring immediate intervention beyond what was provided in the ED, and I believe patient is safe for discharge.  Regardless, an unremarkable evaluation in the ED does not preclude the development or presence of a serious of life threatening condition. As such, patient was instructed to return immediately for any worsening or change in current symptoms.      ED Medication(s):  Medications - No data to display    Discharge Medication List as of 8/24/2017  7:46 PM          Follow-up Information     [facility]. Schedule an appointment as soon as possible for a visit in 2 days.    Why:  Can return to the ER, If symptoms worsen  Contact information:  [facility] [facility] [address]  [phone]                     Medical Decision Making              Scribe Attestation:   Scribe #1: I performed the above scribed service and the documentation accurately describes the services I performed. I attest to the accuracy of the note.    Attending:   Physician Attestation Statement for Scribe #1: I, Norman Ricks MD, personally performed the services described in this documentation, as scribed by Mario Jennings, in my presence, and it is both accurate and complete.          Clinical Impression       ICD-10-CM ICD-9-CM   1. Acute allergic conjunctivitis of left eye H10.12 372.05       Disposition:   Disposition: Discharged  Condition: Stable         Norman Ricks  MD  08/28/17 3649

## 2017-09-03 ENCOUNTER — HOSPITAL ENCOUNTER (EMERGENCY)
Facility: HOSPITAL | Age: 22
Discharge: HOME | End: 2017-09-03
Payer: MEDICAID

## 2017-09-03 VITALS
HEART RATE: 82 BPM | TEMPERATURE: 98 F | DIASTOLIC BLOOD PRESSURE: 71 MMHG | HEIGHT: 62 IN | WEIGHT: 150 LBS | SYSTOLIC BLOOD PRESSURE: 152 MMHG | BODY MASS INDEX: 27.6 KG/M2 | OXYGEN SATURATION: 100 % | RESPIRATION RATE: 18 BRPM

## 2017-09-03 DIAGNOSIS — R11.10 VOMITING, INTRACTABILITY OF VOMITING NOT SPECIFIED, PRESENCE OF NAUSEA NOT SPECIFIED, UNSPECIFIED VOMITING TYPE: Primary | ICD-10-CM

## 2017-09-03 PROCEDURE — 99283 EMERGENCY DEPT VISIT LOW MDM: CPT

## 2017-09-03 RX ORDER — ONDANSETRON 4 MG/1
4 TABLET, FILM COATED ORAL EVERY 6 HOURS
Qty: 12 TABLET | Refills: 0 | Status: SHIPPED | OUTPATIENT
Start: 2017-09-03 | End: 2018-02-12

## 2017-09-03 RX ORDER — CIPROFLOXACIN 500 MG/1
500 TABLET ORAL 2 TIMES DAILY
Qty: 10 TABLET | Refills: 0 | Status: SHIPPED | OUTPATIENT
Start: 2017-09-03 | End: 2017-09-08

## 2017-09-03 RX ORDER — DICYCLOMINE HYDROCHLORIDE 20 MG/1
20 TABLET ORAL 3 TIMES DAILY
Qty: 20 TABLET | Refills: 0 | Status: SHIPPED | OUTPATIENT
Start: 2017-09-03 | End: 2017-10-03

## 2017-09-03 NOTE — ED PROVIDER NOTES
SCRIBE #1 NOTE: I, Ana Paula Hassan, am scribing for, and in the presence of, WANG Winters. I have scribed the entire note.      History      Chief Complaint   Patient presents with    Abdominal Pain     patient complain of abdominal pain and N/V       Review of patient's allergies indicates:   Allergen Reactions    Fish containing products Anaphylaxis    Sudafed [pseudoephedrine hcl] Other (See Comments)     Tight knot on her neck        HPI   HPI    9/3/2017, 5:39 PM   History obtained from the patient      History of Present Illness: Madelyn Aguilar is a 22 y.o. female patient who has MHx of GERD presents to the Emergency Department for abd pain which onset 1 day ago. Sxs are intermittent and moderate in severity. Pt states sxs onset 1.5 hours after eating pancakes at [employer] yesterday. Pt denies eating any meat products. There are no mitigating or exacerbating factors noted. Associated sxs include nausea, and emesis. Pt denies any fever, chills, constipation, hematochezia, dysuria, hematuria, urinary frequency, diarrhea, and all other sxs at this time. No further complaints or concerns at this time.       Arrival mode: Personal vehicle      PCP: [facility]       Past Medical History:  Past Medical History:   Diagnosis Date    Anemia     GERD (gastroesophageal reflux disease)     Herpes simplex virus (HSV) infection        Past Surgical History:  Past Surgical History:   Procedure Laterality Date     SECTION           Family History:  Family History   Problem Relation Age of Onset    Hypertension Mother     Stroke Mother     Heart attack Mother        Social History:  Social History     Social History Main Topics    Smoking status: Never Smoker    Smokeless tobacco: Not given    Alcohol use No    Drug use: No    Sexual activity: Yes     Partners: Male       ROS   Review of Systems   Constitutional: Negative for chills and fever.   HENT: Negative for sore throat.    Respiratory:  "Negative for shortness of breath.    Cardiovascular: Negative for chest pain.   Gastrointestinal: Positive for abdominal pain, nausea and vomiting. Negative for blood in stool, constipation and diarrhea.   Genitourinary: Negative for dysuria, frequency and hematuria.   Musculoskeletal: Negative for back pain.   Skin: Negative for rash.   Neurological: Negative for weakness.   Hematological: Does not bruise/bleed easily.   All other systems reviewed and are negative.    Physical Exam      Initial Vitals [09/03/17 1724]   BP Pulse Resp Temp SpO2   (!) 152/71 82 18 98.4 °F (36.9 °C) 100 %      MAP       98          Physical Exam  Nursing Notes and Vital Signs Reviewed.  Constitutional: Patient is in no apparent distress. Well-developed and well-nourished.  Head: Atraumatic. Normocephalic.  Eyes: PERRL. EOM intact. Conjunctivae are not pale. No scleral icterus.  ENT: Mucous membranes are moist. Oropharynx is clear and symmetric.    Neck: Supple. Full ROM. No lymphadenopathy.  Cardiovascular: Regular rate. Regular rhythm. No murmurs, rubs, or gallops. Distal pulses are 2+ and symmetric.  Pulmonary/Chest: No respiratory distress. Clear to auscultation bilaterally. No wheezing, rales, or rhonchi.  Abdominal: Soft and non-distended.  Mild diffuse tenderness.  No rebound, guarding, or rigidity. Hyperactive bowel sounds. No hematosplenomegaly.   Genitourinary: No CVA tenderness  Musculoskeletal: Moves all extremities. No obvious deformities. No edema. No calf tenderness.  Skin: Warm and dry.  Neurological:  Alert, awake, and appropriate.  Normal speech.  No acute focal neurological deficits are appreciated.  Psychiatric: Normal affect. Good eye contact. Appropriate in content.    ED Course    Procedures  ED Vital Signs:  Vitals:    09/03/17 1724   BP: (!) 152/71   Pulse: 82   Resp: 18   Temp: 98.4 °F (36.9 °C)   TempSrc: Oral   SpO2: 100%   Weight: 68 kg (150 lb)   Height: 5' 2" (1.575 m)            The Emergency Provider " reviewed the vital signs and test results, which are outlined above.    ED Discussion   5:53 PM:  Discussed with pt all pertinent ED information and results. Discussed pt dx and plan of tx. Gave pt all f/u and return to the ED instructions. All questions and concerns were addressed at this time. Pt expresses understanding of information and instructions, and is comfortable with plan to discharge. Pt is stable for discharge.    I discussed with patient and/or family/caretaker that evaluation in the ED does not suggest any emergent or life threatening medical conditions requiring immediate intervention beyond what was provided in the ED, and I believe patient is safe for discharge.  Regardless, an unremarkable evaluation in the ED does not preclude the development or presence of a serious of life threatening condition. As such, patient was instructed to return immediately for any worsening or change in current symptoms.      ED Medication(s):  Medications - No data to display    Discharge Medication List as of 9/3/2017  5:57 PM      START taking these medications    Details   ciprofloxacin HCl (CIPRO) 500 MG tablet Take 1 tablet (500 mg total) by mouth 2 (two) times daily., Starting Sun 9/3/2017, Until Fri 9/8/2017, Print      dicyclomine (BENTYL) 20 mg tablet Take 1 tablet (20 mg total) by mouth 3 (three) times daily., Starting Sun 9/3/2017, Until Tue 10/3/2017, Print      ondansetron (ZOFRAN) 4 MG tablet Take 1 tablet (4 mg total) by mouth every 6 (six) hours., Starting Sun 9/3/2017, Print             Follow-up Information     [facility] In 3 days.    Why:  If symptoms worsen or no improvement                   Medical Decision Making              Scribe Attestation:   Scribe #1: I performed the above scribed service and the documentation accurately describes the services I performed. I attest to the accuracy of the note.    Attending:   Physician Attestation Statement for Scribe #1: I, WANG Winters,  personally performed the services described in this documentation, as scribed by Ana Paula Hassan, in my presence, and it is both accurate and complete.         Attending Attestation:     Physician Attestation Statement for NP/PA:   I discussed this assessment and plan of this patient with the NP/PA, but I did not personally examine the patient. The face to face encounter was performed by the NP/PA.              Clinical Impression       ICD-10-CM ICD-9-CM   1. Vomiting, intractability of vomiting not specified, presence of nausea not specified, unspecified vomiting type R11.10 787.03       Disposition:   Disposition: Discharged  Condition: Stable         WANG Price  09/03/17 6527       Sha Cline MD  09/03/17 6416

## 2017-12-20 ENCOUNTER — PATIENT MESSAGE (OUTPATIENT)
Dept: OBSTETRICS AND GYNECOLOGY | Facility: CLINIC | Age: 22
End: 2017-12-20

## 2017-12-20 RX ORDER — ACYCLOVIR 400 MG/1
400 TABLET ORAL 2 TIMES DAILY
Qty: 20 TABLET | Refills: 4 | Status: SHIPPED | OUTPATIENT
Start: 2017-12-20 | End: 2018-05-09 | Stop reason: SDUPTHER

## 2018-01-09 ENCOUNTER — HOSPITAL ENCOUNTER (EMERGENCY)
Facility: HOSPITAL | Age: 23
Discharge: HOME | End: 2018-01-10
Attending: EMERGENCY MEDICINE
Payer: MEDICAID

## 2018-01-09 VITALS
HEART RATE: 98 BPM | RESPIRATION RATE: 18 BRPM | DIASTOLIC BLOOD PRESSURE: 63 MMHG | BODY MASS INDEX: 26.37 KG/M2 | OXYGEN SATURATION: 99 % | WEIGHT: 143.31 LBS | TEMPERATURE: 100 F | SYSTOLIC BLOOD PRESSURE: 105 MMHG | HEIGHT: 62 IN

## 2018-01-09 DIAGNOSIS — R50.9 FEVER, UNSPECIFIED FEVER CAUSE: ICD-10-CM

## 2018-01-09 DIAGNOSIS — B34.9 VIRAL SYNDROME: Primary | ICD-10-CM

## 2018-01-09 LAB
FLUAV AG SPEC QL IA: NEGATIVE
FLUBV AG SPEC QL IA: NEGATIVE
SPECIMEN SOURCE: NORMAL

## 2018-01-09 PROCEDURE — 25000003 PHARM REV CODE 250: Performed by: NURSE PRACTITIONER

## 2018-01-09 PROCEDURE — 87400 INFLUENZA A/B EACH AG IA: CPT

## 2018-01-09 PROCEDURE — 99283 EMERGENCY DEPT VISIT LOW MDM: CPT

## 2018-01-09 RX ORDER — ACETAMINOPHEN 500 MG
1000 TABLET ORAL
Status: COMPLETED | OUTPATIENT
Start: 2018-01-09 | End: 2018-01-09

## 2018-01-09 RX ORDER — IBUPROFEN 600 MG/1
600 TABLET ORAL EVERY 6 HOURS PRN
Qty: 20 TABLET | Refills: 0 | Status: ON HOLD | OUTPATIENT
Start: 2018-01-09 | End: 2019-07-31 | Stop reason: HOSPADM

## 2018-01-09 RX ORDER — KETOROLAC TROMETHAMINE 30 MG/ML
60 INJECTION, SOLUTION INTRAMUSCULAR; INTRAVENOUS
Status: DISCONTINUED | OUTPATIENT
Start: 2018-01-09 | End: 2018-01-10 | Stop reason: HOSPADM

## 2018-01-09 RX ADMIN — ACETAMINOPHEN 1000 MG: 500 TABLET ORAL at 09:01

## 2018-01-10 NOTE — ED PROVIDER NOTES
SCRIBE #1 NOTE: IChintan, am scribing for, and in the presence of, Leonard Cruz NP. I have scribed the entire note.     SCRIBE #2 NOTE: I, Corinne Mack, am scribing for, and in the presence of,  Leonard Cruz NP. I have scribed the remaining portions of the note not scribed by Scribe #1.     History      Chief Complaint   Patient presents with    Generalized Body Aches     pt got tooth pulled yesterday ever since then fever and body aches        Review of patient's allergies indicates:   Allergen Reactions    Fish containing products Anaphylaxis    Sudafed [pseudoephedrine hcl] Other (See Comments)     Tight knot on her neck        HPI   HPI    2018, 8:39 PM   History obtained from the patient      History of Present Illness: Madelyn Aguilar is a 22 y.o. female patient who presents to the Emergency Department for body aches which onset gradually yesterday after having tooth pulled at dentist. Sxs are constant and moderate in severity. There are no mitigating or exacerbating factors noted. Associated sxs include chills, fatigue, generalized numbness.  Pt denies any fever, N/V/D, trouble swallowing, sore throat, CP, SOB, ABD pain, and all other sxs at this time. No further complaints or concerns at this time.       Arrival mode: Personal vehicle      PCP: [facility]       Past Medical History:  Past Medical History:   Diagnosis Date    Anemia     GERD (gastroesophageal reflux disease)     Herpes simplex virus (HSV) infection        Past Surgical History:  Past Surgical History:   Procedure Laterality Date     SECTION           Family History:  Family History   Problem Relation Age of Onset    Hypertension Mother     Stroke Mother     Heart attack Mother        Social History:  Social History     Social History Main Topics    Smoking status: Never Smoker    Smokeless tobacco: unknown    Alcohol use No    Drug use: No    Sexual activity: Yes     Partners:  Male       ROS   Review of Systems   Constitutional: Negative for fever.   HENT: Negative for sore throat.    Respiratory: Negative for shortness of breath.    Cardiovascular: Negative for chest pain.   Gastrointestinal: Negative for nausea.   Genitourinary: Negative for dysuria.   Musculoskeletal: Negative for back pain.   Skin: Negative for rash.   Neurological: Negative for weakness.   Hematological: Does not bruise/bleed easily.     Physical Exam      Initial Vitals [01/09/18 1901]   BP Pulse Resp Temp SpO2   (!) 148/81 105 18 (!) 100.7 °F (38.2 °C) 100 %      MAP       103.33          Physical Exam  Nursing Notes and Vital Signs Reviewed.  Constitutional: Patient is in no acute distress. Well-developed and well-nourished.  Head: Atraumatic. Normocephalic.  Eyes: PERRL. EOM intact. Conjunctivae are not pale. No scleral icterus.  ENT: Mucous membranes are moist. Oropharynx is clear and symmetric.    Mouth/Throat: No evident facial swelling. Patient handles secretions normally. Stitch to left lower gum at tooth #19. No palpable fluctuance. No evidence of periodontal or periapical abscess. No trismus.   Neck: Supple. Full ROM. No lymphadenopathy.  Cardiovascular: Regular rate. Regular rhythm. No murmurs, rubs, or gallops. Distal pulses are 2+ and symmetric.  Pulmonary/Chest: No respiratory distress. Clear to auscultation bilaterally. No wheezing or rales.  Abdominal: Soft and non-distended.  There is no tenderness.  No rebound, guarding, or rigidity. Good bowel sounds.  Genitourinary: No CVA tenderness  Musculoskeletal: Moves all extremities. No obvious deformities. No edema. No calf tenderness.  Skin: Warm and dry.  Neurological:  Alert, awake, and appropriate.  Normal speech.  No acute focal neurological deficits are appreciated.  Psychiatric: Normal affect. Good eye contact. Appropriate in content.    ED Course    Procedures  ED Vital Signs:  Vitals:    01/09/18 1901 01/09/18 2226 01/09/18 2249   BP: (!) 148/81   "105/63   Pulse: 105  98   Resp: 18  18   Temp: (!) 100.7 °F (38.2 °C) 99.8 °F (37.7 °C)    TempSrc: Oral Oral    SpO2: 100%  99%   Weight: 65 kg (143 lb 4.8 oz)     Height: 5' 2" (1.575 m)         Abnormal Lab Results:  Labs Reviewed   INFLUENZA A AND B ANTIGEN        All Lab Results:  Results for orders placed or performed during the hospital encounter of 01/09/18   Influenza antigen Nasal Swab   Result Value Ref Range    Influenza A Ag, EIA Negative Negative    Influenza B Ag, EIA Negative Negative    Flu A & B Source Nasal Swab          Imaging Results:  Imaging Results    None                 The Emergency Provider reviewed the vital signs and test results, which are outlined above.    ED Discussion     10:35 PM: Reassessed pt at this time. Pt is awake, alert, and in no distress. Discussed with pt all pertinent ED information and results. Discussed pt dx and plan of tx. Gave pt all f/u and return to the ED instructions. All questions and concerns were addressed at this time. Pt expresses understanding of information and instructions, and is comfortable with plan to discharge. Pt is stable for discharge.    I discussed with patient and/or family/caretaker that evaluation in the ED does not suggest any emergent or life threatening medical conditions requiring immediate intervention beyond what was provided in the ED, and I believe patient is safe for discharge.  Regardless, an unremarkable evaluation in the ED does not preclude the development or presence of a serious of life threatening condition. As such, patient was instructed to return immediately for any worsening or change in current symptoms.    Patient presents with upper respiratory and flulike symptoms. Based on my assessment in the ED, I do not suspect any respiratory, airway, pulmonary, cardiovascular (including myocarditis), metabolic, CNS, medical, or surgical emergency medical condition. I have discussed with the patient and/or caregiver signs and " symptoms for secondary bacterial infections, such as pneumonia. I believe that the patient's symptoms are most consistent with a viral illness, possibly influenza. Patient is safe for discharge home with conservative therapy.      ED Medication(s):  Medications   ketorolac injection 60 mg (60 mg Intramuscular Not Given 1/9/18 2045)   acetaminophen tablet 1,000 mg (1,000 mg Oral Given 1/9/18 2106)       Discharge Medication List as of 1/9/2018 10:35 PM      START taking these medications    Details   ibuprofen (ADVIL,MOTRIN) 600 MG tablet Take 1 tablet (600 mg total) by mouth every 6 (six) hours as needed., Starting Tue 1/9/2018, Print             Follow-up Information     [facility] - .    Specialty:  Emergency Medicine  Why:  As needed, If symptoms worsen  Contact information:  [id number] [place] [id number] [phone]           Schedule an appointment as soon as possible for a visit  with pcp.                   Medical Decision Making    Medical Decision Making:   Clinical Tests:   Lab Tests: Ordered and Reviewed           Scribe Attestation:   Scribe #1: I performed the above scribed service and the documentation accurately describes the services I performed. I attest to the accuracy of the note.    Attending:   Physician Attestation Statement for Scribe #1: I, Leonard Cruz NP, personally performed the services described in this documentation, as scribed by Chintan Plata, in my presence, and it is both accurate and complete.         Attending Attestation:           Physician Attestation for Scribe:    Physician Attestation Statement for Scribe #2: I, Leonard Cruz NP, reviewed documentation, as scribed by Corinne Mack in my presence, and it is both accurate and complete. I also acknowledge and confirm the content of the note done by Scribe #1.          Clinical Impression       ICD-10-CM ICD-9-CM   1. Viral syndrome B34.9 079.99   2. Fever, unspecified  fever cause R50.9 780.60       Disposition:   Disposition: Discharged  Condition: Stable         Leonard Cruz NP  01/10/18 0207

## 2018-01-10 NOTE — ED NOTES
Level of Consciousness: The patient is awake, alert, and oriented with appropriate affect and speech; oriented to person, place and time.Pt states generalized fatigue. Had a tooth pulled. States pain to left side of jaw.   Appearance: Sitting up in chair with no acute distress noted. Clothing and hygiene are clean and worn appropriately.  Skin: Skin is warm and dry with good skin turgor; intact; color consistent with ethnicity.  Mucous membranes are moist.   Musculoskeletal: Moves all extremities well in full range of motion. No obvious deformities or swelling noted.  Respiratory: Airway open and patent, respirations spontaneous, even and unlabored. No accessory muscles in use.   Cardiac: Regular rate and rhythm, good pulses palpated peripherally, capillary refill < 3 seconds.  Neurologic: PERRLA, face exhibits symmetrical expression, hand grasps equal and even bilaterally, normal sensation noted to all extremities and face when touched by a finger.

## 2018-02-05 ENCOUNTER — HOSPITAL ENCOUNTER (EMERGENCY)
Facility: HOSPITAL | Age: 23
Discharge: HOME | End: 2018-02-05
Attending: EMERGENCY MEDICINE
Payer: MEDICAID

## 2018-02-05 ENCOUNTER — TELEPHONE (OUTPATIENT)
Dept: OBSTETRICS AND GYNECOLOGY | Facility: CLINIC | Age: 23
End: 2018-02-05

## 2018-02-05 VITALS
HEART RATE: 102 BPM | BODY MASS INDEX: 26.13 KG/M2 | TEMPERATURE: 99 F | SYSTOLIC BLOOD PRESSURE: 190 MMHG | OXYGEN SATURATION: 100 % | DIASTOLIC BLOOD PRESSURE: 80 MMHG | HEIGHT: 62 IN | RESPIRATION RATE: 20 BRPM | WEIGHT: 142 LBS

## 2018-02-05 DIAGNOSIS — J01.10 ACUTE FRONTAL SINUSITIS, RECURRENCE NOT SPECIFIED: Primary | ICD-10-CM

## 2018-02-05 LAB
RPR SER QL: REACTIVE
RPR SER-TITR: ABNORMAL {TITER}

## 2018-02-05 PROCEDURE — 86593 SYPHILIS TEST NON-TREP QUANT: CPT

## 2018-02-05 PROCEDURE — 36415 COLL VENOUS BLD VENIPUNCTURE: CPT

## 2018-02-05 PROCEDURE — 86780 TREPONEMA PALLIDUM: CPT

## 2018-02-05 PROCEDURE — 99283 EMERGENCY DEPT VISIT LOW MDM: CPT

## 2018-02-05 PROCEDURE — 86592 SYPHILIS TEST NON-TREP QUAL: CPT

## 2018-02-05 RX ORDER — PREDNISONE 50 MG/1
50 TABLET ORAL DAILY
Qty: 5 TABLET | Refills: 0 | Status: SHIPPED | OUTPATIENT
Start: 2018-02-05 | End: 2018-02-10

## 2018-02-05 RX ORDER — CLARITHROMYCIN 500 MG/1
500 TABLET, FILM COATED ORAL EVERY 12 HOURS
Qty: 20 TABLET | Refills: 0 | Status: SHIPPED | OUTPATIENT
Start: 2018-02-05 | End: 2018-05-11

## 2018-02-05 NOTE — ED PROVIDER NOTES
Encounter Date: 2018       History     Chief Complaint   Patient presents with    Sore Throat     c/o sore throat, body aches, headache, nausea and mild cough     22 year old female with complaint of runny nose, cough, congestion and sore throat X 10 days.  No fever.  No chills.  No difficulty breathing.  No vomiting. No diarrhea.  Reports facial pain.  No alleviating factors. 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            Review of patient's allergies indicates:   Allergen Reactions    Fish containing products Anaphylaxis    Sudafed [pseudoephedrine hcl] Other (See Comments)     Tight knot on her neck     Past Medical History:   Diagnosis Date    Anemia     GERD (gastroesophageal reflux disease)     Herpes simplex virus (HSV) infection      Past Surgical History:   Procedure Laterality Date     SECTION       Family History   Problem Relation Age of Onset    Hypertension Mother     Stroke Mother     Heart attack Mother      Social History   Substance Use Topics    Smoking status: Never Smoker    Smokeless tobacco: Not on file    Alcohol use No     Review of Systems   Constitutional: Negative for fever.   HENT: Positive for congestion and sore throat.    Respiratory: Positive for cough. Negative for shortness of breath.    Cardiovascular: Negative for chest pain.   Gastrointestinal: Negative for nausea.   Genitourinary: Negative for dysuria.   Musculoskeletal: Negative for back pain.   Skin: Negative for rash.   Neurological: Negative for weakness.   Hematological: Does not bruise/bleed easily.       Physical Exam     Initial Vitals [18 1048]   BP Pulse Resp Temp SpO2   (!) 190/80 102 20 98.6 °F (37 °C) 100 %      MAP       116.67         Physical Exam    Nursing note and vitals reviewed.  Constitutional: She appears well-developed and well-nourished.   HENT:   Head: Normocephalic and atraumatic.   + frontal sinus tenderness, + nasal congestion     Eyes: Conjunctivae and EOM are normal. Pupils are equal, round, and reactive to light.   Neck: Normal range of motion. Neck supple.   Cardiovascular: Normal rate, regular rhythm, normal heart sounds and intact distal pulses.   Pulmonary/Chest: Breath sounds normal.   Abdominal: Soft. There is no tenderness. There is no rebound and no guarding.   Musculoskeletal: Normal range of motion.   Neurological: She is alert and oriented to person, place, and time. She has normal strength and normal reflexes.   Skin: Skin is warm and dry.   Psychiatric: She has a normal mood and affect. Her behavior is normal. Thought content normal.         ED Course   Procedures  Labs Reviewed - No data to display        11:03 AM  Pt will follow up with PCP for RPR titer results                    ED Course      Clinical Impression:   The encounter diagnosis was Acute frontal sinusitis, recurrence not specified.                           Benji Mckeon NP  02/05/18 1106

## 2018-02-05 NOTE — TELEPHONE ENCOUNTER
----- Message from Heide Villafana sent at 2/5/2018  9:14 AM CST -----  Contact: Patient  Patient called to speak with the nurse; she would like to be worked in today for Pap/Medication Refill/Having a breakout and it's getting out of hand. She doesn't want to see anyone else. She can be contacted at [phone].    Thanks,  Heide

## 2018-02-06 ENCOUNTER — OFFICE VISIT (OUTPATIENT)
Dept: OBSTETRICS AND GYNECOLOGY | Facility: CLINIC | Age: 23
End: 2018-02-06
Payer: MEDICAID

## 2018-02-06 ENCOUNTER — LAB VISIT (OUTPATIENT)
Dept: LAB | Facility: HOSPITAL | Age: 23
End: 2018-02-06
Attending: NURSE PRACTITIONER
Payer: MEDICAID

## 2018-02-06 VITALS
DIASTOLIC BLOOD PRESSURE: 96 MMHG | BODY MASS INDEX: 26.68 KG/M2 | HEIGHT: 62 IN | WEIGHT: 145 LBS | SYSTOLIC BLOOD PRESSURE: 157 MMHG

## 2018-02-06 DIAGNOSIS — Z11.3 SCREENING FOR STD (SEXUALLY TRANSMITTED DISEASE): ICD-10-CM

## 2018-02-06 DIAGNOSIS — A50.9 SYPHILIS, CONGENITAL: ICD-10-CM

## 2018-02-06 DIAGNOSIS — Z11.3 SCREENING FOR STD (SEXUALLY TRANSMITTED DISEASE): Primary | ICD-10-CM

## 2018-02-06 PROCEDURE — 80074 ACUTE HEPATITIS PANEL: CPT

## 2018-02-06 PROCEDURE — 99999 PR PBB SHADOW E&M-EST. PATIENT-LVL III: CPT | Mod: PBBFAC,,, | Performed by: NURSE PRACTITIONER

## 2018-02-06 PROCEDURE — 86703 HIV-1/HIV-2 1 RESULT ANTBDY: CPT

## 2018-02-06 PROCEDURE — 99214 OFFICE O/P EST MOD 30 MIN: CPT | Mod: S$PBB,,, | Performed by: NURSE PRACTITIONER

## 2018-02-06 PROCEDURE — 87491 CHLMYD TRACH DNA AMP PROBE: CPT

## 2018-02-06 PROCEDURE — 99213 OFFICE O/P EST LOW 20 MIN: CPT | Mod: PBBFAC,PO | Performed by: NURSE PRACTITIONER

## 2018-02-06 PROCEDURE — 36415 COLL VENOUS BLD VENIPUNCTURE: CPT | Mod: PO

## 2018-02-06 PROCEDURE — 3008F BODY MASS INDEX DOCD: CPT | Mod: ,,, | Performed by: NURSE PRACTITIONER

## 2018-02-06 RX ADMIN — PENICILLIN G BENZATHINE 1.2 MILLION UNITS: 1200000 INJECTION, SUSPENSION INTRAMUSCULAR at 11:02

## 2018-02-06 NOTE — PROGRESS NOTES
Bicillin 1.2 Million units given IM right ventrogluteal. And Bicillin 1.2 Million units given IM left ventrogluteal. Order per Apurva Abbasi on 2/6/18. No complications.

## 2018-02-06 NOTE — PROGRESS NOTES
"Madelyn Aguilar is a 22 y.o. female  presents for STD testing.  Pt went to ER yesterday for other reasons. She has history of congential syphilis and a RPR was drawn and was 1:8 pt states her last one in 2017 was 1:4, discussed with Dr. Norma Chen who typically treats pt and she stated she would need a dose of penicillin with a increase in her titers.    Past Medical History:   Diagnosis Date    Anemia     GERD (gastroesophageal reflux disease)     Herpes simplex virus (HSV) infection     Syphilis, congenital      Past Surgical History:   Procedure Laterality Date     SECTION       Social History   Substance Use Topics    Smoking status: Never Smoker    Smokeless tobacco: Never Used    Alcohol use No     Family History   Problem Relation Age of Onset    Hypertension Mother     Stroke Mother     Heart attack Mother      OB History    Para Term  AB Living   2 1 1 0 0 1   SAB TAB Ectopic Multiple Live Births   0 0 0 0        # Outcome Date GA Lbr Dusty/2nd Weight Sex Delivery Anes PTL Lv   2             1 Term                   BP (!) 157/96   Ht 5' 2" (1.575 m)   Wt 65.8 kg (145 lb)   LMP 2018   BMI 26.52 kg/m²     ROS:  Per hpi    PHYSICAL EXAM:  VULVA: normal appearing vulva with no masses, tenderness or lesions, VAGINA: normal appearing vagina with normal color and discharge, no lesions, CERVIX: normal appearing cervix without discharge or lesions, DNA probe for chlamydia and GC obtained, UTERUS: uterus is normal size, shape, consistency and nontender, ADNEXA: normal adnexa in size, nontender and no masses    ASSESSMENT and PLAN:  1. Screening for STD (sexually transmitted disease)  C. trachomatis/N. gonorrhoeae by AMP DNA Cervix    HIV-1 and HIV-2 antibodies    Hepatitis panel, acute   2. Syphilis, congenital  penicillin G benzathine (BICILLIN LA) injection 1.2 Million Units    penicillin G benzathine (BICILLIN LA) injection 1.2 Million Units "       Will treat her rise in RPR titers today with one dose of penicillin G 2.4 million units

## 2018-02-07 LAB
C TRACH DNA SPEC QL NAA+PROBE: NOT DETECTED
HIV 1+2 AB+HIV1 P24 AG SERPL QL IA: NEGATIVE
N GONORRHOEA DNA SPEC QL NAA+PROBE: NOT DETECTED
T PALLIDUM AB SER QL IF: REACTIVE

## 2018-02-08 ENCOUNTER — PATIENT MESSAGE (OUTPATIENT)
Dept: OBSTETRICS AND GYNECOLOGY | Facility: CLINIC | Age: 23
End: 2018-02-08

## 2018-02-08 LAB
HAV IGM SERPL QL IA: NEGATIVE
HBV CORE IGM SERPL QL IA: NEGATIVE
HBV SURFACE AG SERPL QL IA: NEGATIVE
HCV AB SERPL QL IA: NEGATIVE

## 2018-02-10 ENCOUNTER — PATIENT MESSAGE (OUTPATIENT)
Dept: OBSTETRICS AND GYNECOLOGY | Facility: CLINIC | Age: 23
End: 2018-02-10

## 2018-02-12 ENCOUNTER — OFFICE VISIT (OUTPATIENT)
Dept: OBSTETRICS AND GYNECOLOGY | Facility: CLINIC | Age: 23
End: 2018-02-12
Payer: MEDICAID

## 2018-02-12 VITALS
WEIGHT: 145.5 LBS | DIASTOLIC BLOOD PRESSURE: 79 MMHG | SYSTOLIC BLOOD PRESSURE: 124 MMHG | HEIGHT: 62 IN | BODY MASS INDEX: 26.78 KG/M2

## 2018-02-12 DIAGNOSIS — B37.31 YEAST VAGINITIS: ICD-10-CM

## 2018-02-12 DIAGNOSIS — Z12.4 SCREENING FOR CERVICAL CANCER: ICD-10-CM

## 2018-02-12 DIAGNOSIS — Z01.419 ENCOUNTER FOR GYNECOLOGICAL EXAMINATION (GENERAL) (ROUTINE) WITHOUT ABNORMAL FINDINGS: Primary | ICD-10-CM

## 2018-02-12 DIAGNOSIS — Z30.011 ENCOUNTER FOR INITIAL PRESCRIPTION OF CONTRACEPTIVE PILLS: ICD-10-CM

## 2018-02-12 LAB
CANDIDA RRNA VAG QL PROBE: POSITIVE
G VAGINALIS RRNA GENITAL QL PROBE: POSITIVE
T VAGINALIS RRNA GENITAL QL PROBE: NEGATIVE

## 2018-02-12 PROCEDURE — 99999 PR PBB SHADOW E&M-EST. PATIENT-LVL II: CPT | Mod: PBBFAC,,, | Performed by: OBSTETRICS & GYNECOLOGY

## 2018-02-12 PROCEDURE — 88175 CYTOPATH C/V AUTO FLUID REDO: CPT | Performed by: PATHOLOGY

## 2018-02-12 PROCEDURE — 88141 CYTOPATH C/V INTERPRET: CPT | Mod: ,,, | Performed by: PATHOLOGY

## 2018-02-12 PROCEDURE — 99212 OFFICE O/P EST SF 10 MIN: CPT | Mod: PBBFAC,PN | Performed by: OBSTETRICS & GYNECOLOGY

## 2018-02-12 PROCEDURE — 99395 PREV VISIT EST AGE 18-39: CPT | Mod: S$PBB,,, | Performed by: OBSTETRICS & GYNECOLOGY

## 2018-02-12 PROCEDURE — 87480 CANDIDA DNA DIR PROBE: CPT

## 2018-02-12 RX ORDER — HYDROCODONE BITARTRATE AND ACETAMINOPHEN 7.5; 325 MG/1; MG/1
1 TABLET ORAL
Refills: 0 | COMMUNITY
Start: 2018-02-05 | End: 2019-07-23 | Stop reason: ALTCHOICE

## 2018-02-12 RX ORDER — NORGESTIMATE AND ETHINYL ESTRADIOL 7DAYSX3 28
1 KIT ORAL DAILY
Qty: 28 TABLET | Refills: 11 | Status: SHIPPED | OUTPATIENT
Start: 2018-02-12 | End: 2019-03-08 | Stop reason: SDUPTHER

## 2018-02-12 RX ORDER — FLUCONAZOLE 200 MG/1
200 TABLET ORAL DAILY
Qty: 3 TABLET | Refills: 0 | Status: SHIPPED | OUTPATIENT
Start: 2018-02-12 | End: 2018-02-15

## 2018-02-12 NOTE — PROGRESS NOTES
Subjective:       Patient ID: Madelyn Aguilar is a 22 y.o. female.    Chief Complaint:  Well Woman      History of Present Illness  HPI  Annual Exam-Premenopausal  Patient presents for annual exam. The patient has no complaints today. The patient is sexually active. GYN screening history: last pap: was normal and patient does not recall when last pap was. The patient wears seatbelts: yes. The patient participates in regular exercise: no. Has the patient ever been transfused or tattooed?: yes. The patient reports that there is not domestic violence in her life.      C/o heavier menses on ocp; flow 5 days; using pad/tampon--changing q 2 hrs; also c/o worsening dysmenorhea      GYN & OB History  Patient's last menstrual period was 2018.   Date of Last Pap: No result found    OB History    Para Term  AB Living   2 1 1 0 0 1   SAB TAB Ectopic Multiple Live Births   0 0 0 0        # Outcome Date GA Lbr Dusty/2nd Weight Sex Delivery Anes PTL Lv   2             1 Term                   Review of Systems  Review of Systems   Constitutional: Negative for activity change, appetite change, chills, diaphoresis, fatigue, fever and unexpected weight change.   HENT: Negative for mouth sores and tinnitus.    Eyes: Negative for discharge and visual disturbance.   Respiratory: Negative for cough, shortness of breath and wheezing.    Cardiovascular: Negative for chest pain, palpitations and leg swelling.   Gastrointestinal: Negative for abdominal pain, bloating, blood in stool, constipation, diarrhea, nausea and vomiting.   Endocrine: Negative for diabetes, hair loss, hot flashes, hyperthyroidism and hypothyroidism.   Genitourinary: Positive for vaginal discharge. Negative for decreased libido, dyspareunia, dysuria, flank pain, frequency, genital sores, hematuria, menorrhagia, menstrual problem, pelvic pain, urgency, vaginal bleeding, vaginal pain, dysmenorrhea, urinary incontinence, postcoital  bleeding, postmenopausal bleeding and vaginal odor.   Musculoskeletal: Negative for back pain and myalgias.   Skin:  Negative for rash, no acne and hair changes.   Neurological: Negative for seizures, syncope, numbness and headaches.   Hematological: Negative for adenopathy. Does not bruise/bleed easily.   Psychiatric/Behavioral: Negative for depression and sleep disturbance. The patient is not nervous/anxious.    Breast: Negative for breast mass, breast pain, nipple discharge and skin changes          Objective:    Physical Exam:   Constitutional: She appears well-developed.     Eyes: Conjunctivae and EOM are normal. Pupils are equal, round, and reactive to light.    Neck: Normal range of motion. Neck supple.     Pulmonary/Chest: Effort normal. Right breast exhibits no mass, no nipple discharge, no skin change and no tenderness. Left breast exhibits no mass, no nipple discharge, no skin change and no tenderness. Breasts are symmetrical.        Abdominal: Soft.     Genitourinary: Rectum normal. Pelvic exam was performed with patient supine. Uterus is enlarged. Cervix is normal. Right adnexum displays no mass and no tenderness. Left adnexum displays no mass and no tenderness. No erythema, bleeding, rectocele, cystocele or unspecified prolapse of vaginal walls in the vagina. Vaginal discharge (white) found. Labial bartholins normal.Additional cervical findings: pap smear done       Uterus Size: 8 cm   Musculoskeletal: Normal range of motion.       Neurological: She is alert.    Skin: Skin is warm.    Psychiatric: She has a normal mood and affect.          Assessment:     Encounter Diagnoses   Name Primary?    Encounter for gynecological examination (general) (routine) without abnormal findings Yes    Screening for cervical cancer     Yeast vaginitis                  Plan:      Continue annual well woman exam.  Pap today; aware if neg; pap due in 2021  Affirm today  rx sent for diflucan; advised topical  hydrocortisone  Gentle skin cleansing; advise watch sugars/starch in diet  Safe sex  Continue ocp as directed, if still with worsening menses may need sono

## 2018-02-13 ENCOUNTER — PATIENT MESSAGE (OUTPATIENT)
Dept: OBSTETRICS AND GYNECOLOGY | Facility: CLINIC | Age: 23
End: 2018-02-13

## 2018-02-21 ENCOUNTER — TELEPHONE (OUTPATIENT)
Dept: OBSTETRICS AND GYNECOLOGY | Facility: CLINIC | Age: 23
End: 2018-02-21

## 2018-02-21 ENCOUNTER — PATIENT MESSAGE (OUTPATIENT)
Dept: OBSTETRICS AND GYNECOLOGY | Facility: CLINIC | Age: 23
End: 2018-02-21

## 2018-02-21 NOTE — TELEPHONE ENCOUNTER
Pt has been made aware of ascus pap results and informed of the purpose of a colpo. She has been advised to take nsaids and to not be on her cycle at time of procedure. Pt verbalizes understanding. DS

## 2018-02-21 NOTE — TELEPHONE ENCOUNTER
----- Message from Caitlyn Wagner sent at 2/21/2018 10:50 AM CST -----  Contact: pt  She's returning a missed call from Nurse Edilia, please advise [phone] (home)

## 2018-02-21 NOTE — TELEPHONE ENCOUNTER
Left messages for the pt. to call back. tatyana navas    Please advise pt that her pap is mildly abnormal (ascus-h) .  Please schedule pt a colpo procedure (a procedure where dr manzo looks at cervix through the microscope); remind pt to premedicated with nsaid prior to appt and that she cannot be on menses for the procedure

## 2018-03-08 ENCOUNTER — TELEPHONE (OUTPATIENT)
Dept: OBSTETRICS AND GYNECOLOGY | Facility: CLINIC | Age: 23
End: 2018-03-08

## 2018-03-26 ENCOUNTER — PATIENT MESSAGE (OUTPATIENT)
Dept: OBSTETRICS AND GYNECOLOGY | Facility: CLINIC | Age: 23
End: 2018-03-26

## 2018-05-09 RX ORDER — ACYCLOVIR 400 MG/1
400 TABLET ORAL 2 TIMES DAILY
Qty: 20 TABLET | Refills: 4 | Status: SHIPPED | OUTPATIENT
Start: 2018-05-09 | End: 2018-05-11 | Stop reason: SDUPTHER

## 2018-05-11 ENCOUNTER — PROCEDURE VISIT (OUTPATIENT)
Dept: OBSTETRICS AND GYNECOLOGY | Facility: CLINIC | Age: 23
End: 2018-05-11
Payer: MEDICAID

## 2018-05-11 VITALS
WEIGHT: 152.13 LBS | SYSTOLIC BLOOD PRESSURE: 136 MMHG | BODY MASS INDEX: 27.99 KG/M2 | DIASTOLIC BLOOD PRESSURE: 80 MMHG | HEIGHT: 62 IN

## 2018-05-11 DIAGNOSIS — R87.611 PAP SMEAR OF CERVIX WITH ASCUS, CANNOT EXCLUDE HGSIL: Primary | ICD-10-CM

## 2018-05-11 LAB
B-HCG UR QL: NEGATIVE
CTP QC/QA: YES

## 2018-05-11 PROCEDURE — 88305 TISSUE EXAM BY PATHOLOGIST: CPT | Mod: 26,,, | Performed by: PATHOLOGY

## 2018-05-11 PROCEDURE — 81025 URINE PREGNANCY TEST: CPT | Mod: PBBFAC,PN | Performed by: OBSTETRICS & GYNECOLOGY

## 2018-05-11 PROCEDURE — 88342 IMHCHEM/IMCYTCHM 1ST ANTB: CPT | Mod: 26,,, | Performed by: PATHOLOGY

## 2018-05-11 PROCEDURE — 57454 BX/CURETT OF CERVIX W/SCOPE: CPT | Mod: PBBFAC,PN | Performed by: OBSTETRICS & GYNECOLOGY

## 2018-05-11 PROCEDURE — 88305 TISSUE EXAM BY PATHOLOGIST: CPT | Performed by: PATHOLOGY

## 2018-05-11 PROCEDURE — 88342 IMHCHEM/IMCYTCHM 1ST ANTB: CPT | Performed by: PATHOLOGY

## 2018-05-11 RX ORDER — AMOXICILLIN 500 MG/1
CAPSULE ORAL
Refills: 0 | COMMUNITY
Start: 2018-02-05 | End: 2019-03-05

## 2018-05-11 RX ORDER — ACYCLOVIR 400 MG/1
400 TABLET ORAL DAILY
Qty: 30 TABLET | Refills: 11 | Status: SHIPPED | OUTPATIENT
Start: 2018-05-11 | End: 2018-06-26 | Stop reason: SDUPTHER

## 2018-05-11 NOTE — PROCEDURES
Colposcopy  Date/Time: 5/11/2018 4:24 PM  Performed by: JHOAN BOYER  Authorized by: JHOAN BOYER     Consent Done?:  Yes (Written)    Colposcopy Site:  Cervix  Position:  Supine  Acrowhite Lesion? Yes    Atypical Vessels: No    Transformation Zone Adequate?: Yes    Biopsy?: Yes         Location:  Cervix ((6 00))  ECC Performed?: Yes    Estimated blood loss (cc):  1   Patient tolerated the procedure well with no immediate complications.   Post-operative instructions were provided for the patient.   Patient was discharged and will follow up if any complications occur

## 2018-06-11 ENCOUNTER — TELEPHONE (OUTPATIENT)
Dept: OBSTETRICS AND GYNECOLOGY | Facility: CLINIC | Age: 23
End: 2018-06-11

## 2018-06-11 NOTE — TELEPHONE ENCOUNTER
Left messages for the pt. to call back. tatyana navas    Please advise colpo biopsies show hsil; need repeat colpo in 5-6 months; will also redo pap at that time; please piter colpo

## 2018-06-25 ENCOUNTER — PATIENT MESSAGE (OUTPATIENT)
Dept: OBSTETRICS AND GYNECOLOGY | Facility: CLINIC | Age: 23
End: 2018-06-25

## 2018-06-26 RX ORDER — ACYCLOVIR 400 MG/1
400 TABLET ORAL DAILY
Qty: 30 TABLET | Refills: 11 | Status: SHIPPED | OUTPATIENT
Start: 2018-06-26 | End: 2019-07-04 | Stop reason: SDUPTHER

## 2018-06-28 ENCOUNTER — TELEPHONE (OUTPATIENT)
Dept: OBSTETRICS AND GYNECOLOGY | Facility: CLINIC | Age: 23
End: 2018-06-28

## 2018-06-28 NOTE — TELEPHONE ENCOUNTER
----- Message from Caitlyn Wagner sent at 6/28/2018  2:16 PM CDT -----  Contact: pt  She's calling stating that she received a message on her [facility] chart to call in and speak to a nurse, please advise [phone] (home)

## 2018-06-28 NOTE — TELEPHONE ENCOUNTER
Pt has been made aware of bx results and will schedule an appointment/ schedule for irregular bleeding assessment. DS

## 2018-07-17 ENCOUNTER — PATIENT MESSAGE (OUTPATIENT)
Dept: OBSTETRICS AND GYNECOLOGY | Facility: CLINIC | Age: 23
End: 2018-07-17

## 2018-07-18 ENCOUNTER — OFFICE VISIT (OUTPATIENT)
Dept: OBSTETRICS AND GYNECOLOGY | Facility: CLINIC | Age: 23
End: 2018-07-18
Payer: MEDICAID

## 2018-07-18 VITALS
SYSTOLIC BLOOD PRESSURE: 130 MMHG | BODY MASS INDEX: 28.76 KG/M2 | DIASTOLIC BLOOD PRESSURE: 88 MMHG | WEIGHT: 156.31 LBS | HEIGHT: 62 IN

## 2018-07-18 DIAGNOSIS — N76.0 BV (BACTERIAL VAGINOSIS): Primary | ICD-10-CM

## 2018-07-18 DIAGNOSIS — B96.89 BV (BACTERIAL VAGINOSIS): Primary | ICD-10-CM

## 2018-07-18 PROCEDURE — 87660 TRICHOMONAS VAGIN DIR PROBE: CPT

## 2018-07-18 PROCEDURE — 99999 PR PBB SHADOW E&M-EST. PATIENT-LVL III: CPT | Mod: PBBFAC,,, | Performed by: NURSE PRACTITIONER

## 2018-07-18 PROCEDURE — 99213 OFFICE O/P EST LOW 20 MIN: CPT | Mod: S$PBB,,, | Performed by: NURSE PRACTITIONER

## 2018-07-18 PROCEDURE — 87491 CHLMYD TRACH DNA AMP PROBE: CPT

## 2018-07-18 PROCEDURE — 99213 OFFICE O/P EST LOW 20 MIN: CPT | Mod: PBBFAC | Performed by: NURSE PRACTITIONER

## 2018-07-18 NOTE — PATIENT INSTRUCTIONS

## 2018-07-18 NOTE — PROGRESS NOTES
"CC: Weight gain and vaginal irritation     Madelyn Aguilar is a 22 y.o. female  presents for well woman exam.  LMP: Patient's last menstrual period was 2018..  UPT is negative in clinic. Patient states that she stopped her OCP " to see if this was the reason for her weight gain". Patient states that she has gained 20 pounds in 3 months. Patient reports cycles are irregular after restarting OCP. Patient has become sexually active " with her ex and is concerned about STD exposure".      Past Medical History:   Diagnosis Date    Abnormal Pap smear of cervix     Anemia     GERD (gastroesophageal reflux disease)     Herpes simplex virus (HSV) infection     Syphilis, congenital      Past Surgical History:   Procedure Laterality Date     SECTION       Social History     Social History    Marital status: Single     Spouse name: N/A    Number of children: N/A    Years of education: N/A     Occupational History    Not on file.     Social History Main Topics    Smoking status: Never Smoker    Smokeless tobacco: Never Used    Alcohol use No    Drug use: No    Sexual activity: Yes     Partners: Male     Other Topics Concern    Not on file     Social History Narrative    No narrative on file     Family History   Problem Relation Age of Onset    Hypertension Mother     Stroke Mother     Heart attack Mother      OB History      Para Term  AB Living    2 1 1 0 0 1    SAB TAB Ectopic Multiple Live Births    0 0 0 0            /88   Ht 5' 2" (1.575 m)   Wt 70.9 kg (156 lb 4.9 oz)   LMP 2018   BMI 28.59 kg/m²       ROS:  GENERAL: See HPI  CHEST: Denies chest pain or shortness of breath.   CARDIOVASCULAR: Denies palpitations or left sided chest pain.   ABDOMEN: No abdominal pain, constipation, diarrhea, nausea, vomiting or rectal bleeding.   URINARY: No frequency, dysuria, hematuria, or burning on urination.  REPRODUCTIVE: See HPI.       PHYSICAL " EXAM:  APPEARANCE: Well nourished, well developed, in no acute distress.  AFFECT: WNL, alert and oriented x 3  PELVIC: Normal external genitalia mild erythema. Vagina moist and well rugated without lesions or discharge.  Cervix pink, without lesions, discharge or tendernBimanual exam shows uterus to be normal size, regular, mobile and nontender.  Adnexa without masses or tenderness.    EXTREMITIES: No edema.    1. BV (bacterial vaginosis)  C. trachomatis/N. gonorrhoeae by AMP DNA Vagina    Vaginosis Screen by DNA Probe    PLAN:  Patient is aware that she needs to take her OCP as directed  Patient to f/u with PCP related to weight gain  GC and Affirm cx  Patient was counseled today on A.C.S. Pap guidelines and recommendations for yearly pelvic exams, mammograms and monthly self breast exams; to see her PCP for other health maintenance.

## 2018-07-19 ENCOUNTER — PATIENT MESSAGE (OUTPATIENT)
Dept: OBSTETRICS AND GYNECOLOGY | Facility: CLINIC | Age: 23
End: 2018-07-19

## 2018-07-19 RX ORDER — METRONIDAZOLE 500 MG/1
500 TABLET ORAL EVERY 12 HOURS
Qty: 14 TABLET | Refills: 0 | Status: SHIPPED | OUTPATIENT
Start: 2018-07-19 | End: 2018-07-26

## 2018-07-19 RX ORDER — FLUCONAZOLE 150 MG/1
150 TABLET ORAL DAILY
Qty: 2 TABLET | Refills: 1 | Status: SHIPPED | OUTPATIENT
Start: 2018-07-19 | End: 2018-12-28 | Stop reason: SDUPTHER

## 2018-07-21 LAB
C TRACH DNA SPEC QL NAA+PROBE: NOT DETECTED
N GONORRHOEA DNA SPEC QL NAA+PROBE: NOT DETECTED

## 2018-08-10 ENCOUNTER — PATIENT MESSAGE (OUTPATIENT)
Dept: OBSTETRICS AND GYNECOLOGY | Facility: CLINIC | Age: 23
End: 2018-08-10

## 2018-11-15 ENCOUNTER — TELEPHONE (OUTPATIENT)
Dept: OBSTETRICS AND GYNECOLOGY | Facility: CLINIC | Age: 23
End: 2018-11-15

## 2018-11-15 NOTE — TELEPHONE ENCOUNTER
Left messages for the pt. to call back. tatyana navas    Pt. Needs to reschedule her colpo procedure. tatyana Navas

## 2018-12-10 ENCOUNTER — PROCEDURE VISIT (OUTPATIENT)
Dept: OBSTETRICS AND GYNECOLOGY | Facility: CLINIC | Age: 23
End: 2018-12-10
Payer: MEDICAID

## 2018-12-10 VITALS — BODY MASS INDEX: 28.59 KG/M2 | HEIGHT: 62 IN | SYSTOLIC BLOOD PRESSURE: 133 MMHG | DIASTOLIC BLOOD PRESSURE: 84 MMHG

## 2018-12-10 DIAGNOSIS — R87.613 HSIL ON PAP SMEAR OF CERVIX: ICD-10-CM

## 2018-12-10 DIAGNOSIS — R87.610 ATYPICAL SQUAMOUS CELLS OF UNDETERMINED SIGNIFICANCE ON CYTOLOGIC SMEAR OF CERVIX (ASC-US): Primary | ICD-10-CM

## 2018-12-10 LAB
B-HCG UR QL: NEGATIVE
CTP QC/QA: YES

## 2018-12-10 PROCEDURE — 88305 TISSUE EXAM BY PATHOLOGIST: CPT | Mod: 26,,, | Performed by: PATHOLOGY

## 2018-12-10 PROCEDURE — 88342 IMHCHEM/IMCYTCHM 1ST ANTB: CPT | Mod: 26,,, | Performed by: PATHOLOGY

## 2018-12-10 PROCEDURE — 88305 TISSUE EXAM BY PATHOLOGIST: CPT | Performed by: PATHOLOGY

## 2018-12-10 PROCEDURE — 81025 URINE PREGNANCY TEST: CPT | Mod: PBBFAC,PN | Performed by: OBSTETRICS & GYNECOLOGY

## 2018-12-10 PROCEDURE — 88175 CYTOPATH C/V AUTO FLUID REDO: CPT | Performed by: PATHOLOGY

## 2018-12-10 PROCEDURE — 57456 ENDOCERV CURETTAGE W/SCOPE: CPT | Mod: PBBFAC,PN | Performed by: OBSTETRICS & GYNECOLOGY

## 2018-12-10 PROCEDURE — 88141 CYTOPATH C/V INTERPRET: CPT | Mod: ,,, | Performed by: PATHOLOGY

## 2018-12-10 RX ORDER — ERYTHROMYCIN 5 MG/G
OINTMENT OPHTHALMIC
Refills: 0 | COMMUNITY
Start: 2018-09-10 | End: 2019-01-25

## 2018-12-10 RX ORDER — LORATADINE 10 MG/1
10 TABLET ORAL DAILY
Refills: 0 | COMMUNITY
Start: 2018-09-10 | End: 2019-01-25

## 2018-12-10 RX ORDER — OFLOXACIN 3 MG/ML
SOLUTION/ DROPS OPHTHALMIC
Refills: 0 | COMMUNITY
Start: 2018-09-12 | End: 2019-01-25

## 2018-12-10 RX ORDER — FLUTICASONE PROPIONATE 50 MCG
SPRAY, SUSPENSION (ML) NASAL
Refills: 0 | COMMUNITY
Start: 2018-09-10 | End: 2019-01-25 | Stop reason: ALTCHOICE

## 2018-12-10 NOTE — PROCEDURES
Colposcopy  Date/Time: 12/10/2018 12:25 PM  Performed by: Norma Chen MD  Authorized by: Norma Chen MD     Consent Done?:  Yes (Written)    Colposcopy Site:  Cervix  Position:  Supine  Acrowhite Lesion: No    Atypical Vessels: No    Transformation Zone Adequate?: Yes    Biopsy?: No    ECC Performed?: Yes    LEEP Performed?: No     Patient tolerated the procedure well with no immediate complications.   Post-operative instructions were provided for the patient.   Patient was discharged and will follow up if any complications occur

## 2018-12-26 ENCOUNTER — TELEPHONE (OUTPATIENT)
Dept: OBSTETRICS AND GYNECOLOGY | Facility: CLINIC | Age: 23
End: 2018-12-26

## 2018-12-26 NOTE — TELEPHONE ENCOUNTER
Spoke to the pt. and advised her that the colpo biopsy showed a high grade lesion in the cervix; needs leep (cannot be on menses; needs to premedicate); procedure is done in office at [facility]; we inject local anesthesia into the cervix.  Scheduled pt. for a LEEP procedure. tatyana Bahena

## 2018-12-28 ENCOUNTER — TELEPHONE (OUTPATIENT)
Dept: OBSTETRICS AND GYNECOLOGY | Facility: CLINIC | Age: 23
End: 2018-12-28

## 2018-12-28 ENCOUNTER — PATIENT MESSAGE (OUTPATIENT)
Dept: OBSTETRICS AND GYNECOLOGY | Facility: CLINIC | Age: 23
End: 2018-12-28

## 2018-12-28 DIAGNOSIS — B37.31 YEAST VAGINITIS: Primary | ICD-10-CM

## 2018-12-28 RX ORDER — FLUCONAZOLE 150 MG/1
150 TABLET ORAL DAILY
Qty: 2 TABLET | Refills: 1 | Status: SHIPPED | OUTPATIENT
Start: 2018-12-28 | End: 2019-01-25 | Stop reason: ALTCHOICE

## 2018-12-28 RX ORDER — FLUCONAZOLE 200 MG/1
200 TABLET ORAL DAILY
Qty: 3 TABLET | Refills: 0 | Status: SHIPPED | OUTPATIENT
Start: 2018-12-28 | End: 2018-12-31

## 2018-12-28 NOTE — TELEPHONE ENCOUNTER
Pt complains of vaginal itching and discharge without relief from over the counter monistat. Orders pended. Please advise.

## 2018-12-28 NOTE — TELEPHONE ENCOUNTER
Called patient to make sure she was aware that JAMAR Smith had sent Diflucan to her pharmacy.  Patient verbalized that she was aware that Diflucan was sent to her pharmacy.

## 2019-01-22 ENCOUNTER — PATIENT MESSAGE (OUTPATIENT)
Dept: OBSTETRICS AND GYNECOLOGY | Facility: CLINIC | Age: 24
End: 2019-01-22

## 2019-01-24 ENCOUNTER — TELEPHONE (OUTPATIENT)
Dept: OBSTETRICS AND GYNECOLOGY | Facility: CLINIC | Age: 24
End: 2019-01-24

## 2019-01-24 NOTE — TELEPHONE ENCOUNTER
Spoke with pt, pt requested to schedule an appt regarding vaginal discharge. I asked pt if she wanted to cancel her appt on 1/30/19 with Dr. Chen, she stated that this would be ok. Scheduled pt with NP @[facility] on tomorrow. Pt voiced understanding.

## 2019-01-24 NOTE — TELEPHONE ENCOUNTER
----- Message from Kamilah Rodrigues sent at 1/24/2019 11:14 AM CST -----  Contact: self   Patient requesting a call back regarding status of rx refill request . Please call back at       Thanks,  Kamilah Rodrigues

## 2019-01-25 ENCOUNTER — OFFICE VISIT (OUTPATIENT)
Dept: OBSTETRICS AND GYNECOLOGY | Facility: CLINIC | Age: 24
End: 2019-01-25
Payer: MEDICAID

## 2019-01-25 VITALS
DIASTOLIC BLOOD PRESSURE: 74 MMHG | SYSTOLIC BLOOD PRESSURE: 130 MMHG | HEIGHT: 62 IN | WEIGHT: 166.88 LBS | BODY MASS INDEX: 30.71 KG/M2

## 2019-01-25 DIAGNOSIS — N76.0 BV (BACTERIAL VAGINOSIS): Primary | ICD-10-CM

## 2019-01-25 DIAGNOSIS — B96.89 BV (BACTERIAL VAGINOSIS): Primary | ICD-10-CM

## 2019-01-25 PROCEDURE — 99213 PR OFFICE/OUTPT VISIT, EST, LEVL III, 20-29 MIN: ICD-10-PCS | Mod: S$PBB,,, | Performed by: NURSE PRACTITIONER

## 2019-01-25 PROCEDURE — 87510 GARDNER VAG DNA DIR PROBE: CPT

## 2019-01-25 PROCEDURE — 99213 OFFICE O/P EST LOW 20 MIN: CPT | Mod: S$PBB,,, | Performed by: NURSE PRACTITIONER

## 2019-01-25 PROCEDURE — 87480 CANDIDA DNA DIR PROBE: CPT

## 2019-01-25 PROCEDURE — 99999 PR PBB SHADOW E&M-EST. PATIENT-LVL III: ICD-10-PCS | Mod: PBBFAC,,, | Performed by: NURSE PRACTITIONER

## 2019-01-25 PROCEDURE — 99999 PR PBB SHADOW E&M-EST. PATIENT-LVL III: CPT | Mod: PBBFAC,,, | Performed by: NURSE PRACTITIONER

## 2019-01-25 PROCEDURE — 99213 OFFICE O/P EST LOW 20 MIN: CPT | Mod: PBBFAC | Performed by: NURSE PRACTITIONER

## 2019-01-25 PROCEDURE — 87491 CHLMYD TRACH DNA AMP PROBE: CPT

## 2019-01-25 RX ORDER — METRONIDAZOLE 500 MG/1
500 TABLET ORAL 2 TIMES DAILY
Qty: 14 TABLET | Refills: 0 | Status: SHIPPED | OUTPATIENT
Start: 2019-01-25 | End: 2019-02-01

## 2019-01-25 RX ORDER — FLUCONAZOLE 150 MG/1
150 TABLET ORAL DAILY
Qty: 2 TABLET | Refills: 0 | Status: SHIPPED | OUTPATIENT
Start: 2019-01-25 | End: 2019-01-27

## 2019-01-25 NOTE — PROGRESS NOTES
"CC: Vaginal discharge    Madelyn Aguilar is a 23 y.o. female  presents for c/o vaginal discharge.  LMP: Patient's last menstrual period was 2019..  Patient is sexually active.     Past Medical History:   Diagnosis Date    Abnormal Pap smear of cervix     Anemia     GERD (gastroesophageal reflux disease)     Herpes simplex virus (HSV) infection     Syphilis, congenital      Past Surgical History:   Procedure Laterality Date     SECTION       Social History     Socioeconomic History    Marital status: Single     Spouse name: Not on file    Number of children: Not on file    Years of education: Not on file    Highest education level: Not on file   Social Needs    Financial resource strain: Not on file    Food insecurity - worry: Not on file    Food insecurity - inability: Not on file    Transportation needs - medical: Not on file    Transportation needs - non-medical: Not on file   Occupational History    Not on file   Tobacco Use    Smoking status: Never Smoker    Smokeless tobacco: Never Used   Substance and Sexual Activity    Alcohol use: No    Drug use: No    Sexual activity: Not Currently     Partners: Male   Other Topics Concern    Not on file   Social History Narrative    Not on file     Family History   Problem Relation Age of Onset    Hypertension Mother     Stroke Mother     Heart attack Mother     Cancer Mother         bile duct cancer    Heart murmur Father      OB History      Para Term  AB Living    2 1 1 0 1 1    SAB TAB Ectopic Multiple Live Births    1 0 0 0 1          /74   Ht 5' 2" (1.575 m)   Wt 75.7 kg (166 lb 14.2 oz)   LMP 2019   BMI 30.52 kg/m²       ROS:  GENERAL: Denies weight gain or weight loss. Feeling well overall.   ABDOMEN: No abdominal pain, constipation, diarrhea, nausea, vomiting or rectal bleeding.   URINARY: No frequency, dysuria, hematuria, or burning on urination.  REPRODUCTIVE: See HPI. "       PHYSICAL EXAM:  APPEARANCE: Well nourished, well developed, in no acute distress.  AFFECT: WNL, alert and oriented x 3  PELVIC: Normal external genitalia without lesions.  Vagina thick, white discharge. Cervix pink, without lesions, discharge or tenderness.      1. BV (bacterial vaginosis)  C. trachomatis/N. gonorrhoeae by AMP DNA    Vaginosis Screen by DNA Probe    PLAN:  GC and Affirm cx   Diflucan and Flagyl rx

## 2019-01-29 LAB
C TRACH DNA SPEC QL NAA+PROBE: NOT DETECTED
N GONORRHOEA DNA SPEC QL NAA+PROBE: NOT DETECTED

## 2019-02-12 ENCOUNTER — TELEPHONE (OUTPATIENT)
Dept: OBSTETRICS AND GYNECOLOGY | Facility: CLINIC | Age: 24
End: 2019-02-12

## 2019-02-12 NOTE — TELEPHONE ENCOUNTER
----- Message from See Reyes sent at 2/12/2019 10:36 AM CST -----  Contact: pt   Type:  Needs Medical Advice    Who Called: CARLEY PIZARRO  Symptoms (please be specific):   How long has patient had these symptoms:    Pharmacy name and phone #:    Would the patient rather a call back or a response via [facility]?: Callback   Best Call Back Number: [phone] ([place])   Additional Information: pt is requesting a call back from the nurse in regards to the pt needing to reschedule her PROCEDURE because her cycle came on today

## 2019-03-05 ENCOUNTER — PROCEDURE VISIT (OUTPATIENT)
Dept: OBSTETRICS AND GYNECOLOGY | Facility: CLINIC | Age: 24
End: 2019-03-05
Payer: MEDICAID

## 2019-03-05 VITALS
BODY MASS INDEX: 31.68 KG/M2 | SYSTOLIC BLOOD PRESSURE: 128 MMHG | HEIGHT: 62 IN | DIASTOLIC BLOOD PRESSURE: 82 MMHG | WEIGHT: 172.19 LBS

## 2019-03-05 DIAGNOSIS — R87.613 HSIL ON PAP SMEAR OF CERVIX: Primary | ICD-10-CM

## 2019-03-05 PROCEDURE — 88305 TISSUE EXAM BY PATHOLOGIST: CPT | Performed by: PATHOLOGY

## 2019-03-05 PROCEDURE — 88307 TISSUE SPECIMEN TO PATHOLOGY, OBSTETRICS/GYNECOLOGY: ICD-10-PCS | Mod: 26,,, | Performed by: PATHOLOGY

## 2019-03-05 PROCEDURE — 88307 TISSUE EXAM BY PATHOLOGIST: CPT | Performed by: PATHOLOGY

## 2019-03-05 PROCEDURE — 88305 TISSUE EXAM BY PATHOLOGIST: CPT | Mod: 26,,, | Performed by: PATHOLOGY

## 2019-03-05 PROCEDURE — 57461 CONZ OF CERVIX W/SCOPE LEEP: CPT | Mod: S$PBB,,, | Performed by: OBSTETRICS & GYNECOLOGY

## 2019-03-05 PROCEDURE — 88307 TISSUE EXAM BY PATHOLOGIST: CPT | Mod: 26,,, | Performed by: PATHOLOGY

## 2019-03-05 PROCEDURE — 57461 CONZ OF CERVIX W/SCOPE LEEP: CPT | Mod: PBBFAC | Performed by: OBSTETRICS & GYNECOLOGY

## 2019-03-05 PROCEDURE — 57461 PR COLPOSCOPY,CERVIX W/ADJ VAG,W/LOOP CONIZ: ICD-10-PCS | Mod: S$PBB,,, | Performed by: OBSTETRICS & GYNECOLOGY

## 2019-03-05 PROCEDURE — 88305 TISSUE SPECIMEN TO PATHOLOGY, OBSTETRICS/GYNECOLOGY: ICD-10-PCS | Mod: 26,,, | Performed by: PATHOLOGY

## 2019-03-05 NOTE — PROCEDURES
Leep  Date/Time: 3/5/2019 1:37 PM  Performed by: Norma Chen MD  Authorized by: Norma Chen MD     Consent Done?:  Yes (Written)    Colposcopy Site:  Cervix  Position:  Supine   Patient was prepped and draped in the normal sterile fashion.  Anesthesia:  Local infiltration  Local anesthetic:  Lidocaine 2% with epinephrine  Anesthetic total (ml): 10  LEEP Performed?: Yes    Estimated blood loss (cc):  2   Patient tolerated the procedure well with no immediate complications.   Post-operative instructions were provided for the patient.   Patient was discharged and will follow up if any complications occur

## 2019-03-08 DIAGNOSIS — Z30.011 ENCOUNTER FOR INITIAL PRESCRIPTION OF CONTRACEPTIVE PILLS: ICD-10-CM

## 2019-03-08 RX ORDER — NORGESTIMATE AND ETHINYL ESTRADIOL 7DAYSX3 28
1 KIT ORAL DAILY
Qty: 28 TABLET | Refills: 9 | Status: ON HOLD | OUTPATIENT
Start: 2019-03-08 | End: 2019-07-31 | Stop reason: HOSPADM

## 2019-03-14 ENCOUNTER — TELEPHONE (OUTPATIENT)
Dept: OBSTETRICS AND GYNECOLOGY | Facility: CLINIC | Age: 24
End: 2019-03-14

## 2019-03-14 NOTE — TELEPHONE ENCOUNTER
Patient informed that her LEEP result showed LGSIL, encouraged her to keep her follow up appointment as scheduled.  She voiced understanding.

## 2019-03-14 NOTE — TELEPHONE ENCOUNTER
----- Message from Norma Chen MD sent at 3/14/2019  7:59 AM CDT -----  Please advise the leep results show lsil (not as bad as the colpo results); please make sure to keep scheduled post leep follow up appt

## 2019-03-19 ENCOUNTER — OFFICE VISIT (OUTPATIENT)
Dept: OBSTETRICS AND GYNECOLOGY | Facility: CLINIC | Age: 24
End: 2019-03-19
Payer: MEDICAID

## 2019-03-19 VITALS
BODY MASS INDEX: 30.73 KG/M2 | DIASTOLIC BLOOD PRESSURE: 84 MMHG | SYSTOLIC BLOOD PRESSURE: 120 MMHG | HEIGHT: 62 IN | WEIGHT: 167 LBS

## 2019-03-19 DIAGNOSIS — N76.2 ACUTE VULVITIS: ICD-10-CM

## 2019-03-19 DIAGNOSIS — Z98.890 S/P LEEP: Primary | ICD-10-CM

## 2019-03-19 DIAGNOSIS — R10.2 PELVIC PAIN: ICD-10-CM

## 2019-03-19 PROCEDURE — 99024 PR POST-OP FOLLOW-UP VISIT: ICD-10-PCS | Mod: ,,, | Performed by: OBSTETRICS & GYNECOLOGY

## 2019-03-19 PROCEDURE — 99999 PR PBB SHADOW E&M-EST. PATIENT-LVL II: ICD-10-PCS | Mod: PBBFAC,,, | Performed by: OBSTETRICS & GYNECOLOGY

## 2019-03-19 PROCEDURE — 99024 POSTOP FOLLOW-UP VISIT: CPT | Mod: ,,, | Performed by: OBSTETRICS & GYNECOLOGY

## 2019-03-19 PROCEDURE — 99212 OFFICE O/P EST SF 10 MIN: CPT | Mod: PBBFAC | Performed by: OBSTETRICS & GYNECOLOGY

## 2019-03-19 PROCEDURE — 99999 PR PBB SHADOW E&M-EST. PATIENT-LVL II: CPT | Mod: PBBFAC,,, | Performed by: OBSTETRICS & GYNECOLOGY

## 2019-03-19 RX ORDER — CLOTRIMAZOLE AND BETAMETHASONE DIPROPIONATE 10; .64 MG/G; MG/G
CREAM TOPICAL
Qty: 15 G | Refills: 1 | Status: SHIPPED | OUTPATIENT
Start: 2019-03-19 | End: 2020-03-18

## 2019-03-19 NOTE — PROGRESS NOTES
"Subjective:       Patient ID: Madelyn Aguilar is a 23 y.o. female.    Chief Complaint:  Post-op Evaluation      History of Present Illness  HPI  Postoperative Follow-up  Patient presents to the clinic 2 weeks status post leep  for hsil. Eating a regular diet without difficulty. Bowel movements are normal. The patient is not having any pain.  Reports increased vulvar itching  Pt reports cotinued vaginal bleeding; "black " looking vaginal discharge    Aware path report jayna 1; upper /lower ecto, endocervical button; ecc negative    Still waiting to see gi for abdominal pains and upper abdominal bloating  GYN & OB History  Patient's last menstrual period was 2019 (approximate).   Date of Last Pap: 2018    OB History    Para Term  AB Living   2 1 1 0 1 1   SAB TAB Ectopic Multiple Live Births   1 0 0 0 1      # Outcome Date GA Lbr Dusty/2nd Weight Sex Delivery Anes PTL Lv   2 SAB            1 Term     F CS-Unspec   JAYCOB      Complications: Failure to Progress in First Stage          Review of Systems  Review of Systems   All other systems reviewed and are negative.          Objective:      Physical Exam:   Constitutional: She appears well-developed.     Eyes: Conjunctivae and EOM are normal. Pupils are equal, round, and reactive to light.    Neck: Normal range of motion. Neck supple.     Pulmonary/Chest: Effort normal. Right breast exhibits no mass, no nipple discharge, no skin change, no tenderness and presence. Left breast exhibits no mass, no nipple discharge, no skin change, no tenderness and presence. Breasts are symmetrical.        Abdominal: Soft.     Genitourinary: Rectum normal and uterus normal. Pelvic exam was performed with patient supine. Cervix is normal. Vaginal discharge (pink-black discharge) found.               Musculoskeletal: Normal range of motion.       Neurological: She is alert.    Skin: Skin is warm.    Psychiatric: She has a normal mood and affect.         "   Assessment:     Encounter Diagnoses   Name Primary?    Acute vulvitis     S/P LEEP Yes             Plan:      Continue pelvic rest for 2 more weeks  Pap due 2/2020  Pelvic sono ordered for pelvic pain  Pt to follow up regarding pelvic pain after sees gi

## 2019-04-16 ENCOUNTER — TELEPHONE (OUTPATIENT)
Dept: OBSTETRICS AND GYNECOLOGY | Facility: CLINIC | Age: 24
End: 2019-04-16

## 2019-04-16 NOTE — TELEPHONE ENCOUNTER
----- Message from Jennifer Arriaga sent at 4/16/2019  2:13 PM CDT -----  Contact: [phone]  Would like to schedule an appt, please call back at [phone], thanks sj

## 2019-04-16 NOTE — TELEPHONE ENCOUNTER
"Returned call to patient.  She requested an appointment with Dr. Chen before 04/25/19, appointment scheduled through [facility].  She says that she experienced bleeding for 13 days after procedure, then took (3) three urine pregnancy test and all were positive an that she is pregnant, per patient.  Asked if she followed up with GI, she responded "No, I had missed that appointment," per patient.  "

## 2019-04-22 ENCOUNTER — INITIAL PRENATAL (OUTPATIENT)
Dept: OBSTETRICS AND GYNECOLOGY | Facility: CLINIC | Age: 24
End: 2019-04-22
Payer: MEDICAID

## 2019-04-22 VITALS
DIASTOLIC BLOOD PRESSURE: 84 MMHG | SYSTOLIC BLOOD PRESSURE: 132 MMHG | WEIGHT: 167.56 LBS | BODY MASS INDEX: 30.65 KG/M2

## 2019-04-22 DIAGNOSIS — N92.6 MISSED PERIOD: Primary | ICD-10-CM

## 2019-04-22 LAB
B-HCG UR QL: POSITIVE
CTP QC/QA: YES

## 2019-04-22 PROCEDURE — 81025 URINE PREGNANCY TEST: CPT | Mod: PBBFAC,PN | Performed by: OBSTETRICS & GYNECOLOGY

## 2019-04-22 PROCEDURE — 99999 PR PBB SHADOW E&M-EST. PATIENT-LVL II: ICD-10-PCS | Mod: PBBFAC,,, | Performed by: OBSTETRICS & GYNECOLOGY

## 2019-04-22 PROCEDURE — 99999 PR PBB SHADOW E&M-EST. PATIENT-LVL II: CPT | Mod: PBBFAC,,, | Performed by: OBSTETRICS & GYNECOLOGY

## 2019-04-22 PROCEDURE — 99212 OFFICE O/P EST SF 10 MIN: CPT | Mod: PBBFAC,PN | Performed by: OBSTETRICS & GYNECOLOGY

## 2019-04-22 PROCEDURE — 99213 PR OFFICE/OUTPT VISIT, EST, LEVL III, 20-29 MIN: ICD-10-PCS | Mod: S$PBB,,, | Performed by: OBSTETRICS & GYNECOLOGY

## 2019-04-22 PROCEDURE — 99213 OFFICE O/P EST LOW 20 MIN: CPT | Mod: S$PBB,,, | Performed by: OBSTETRICS & GYNECOLOGY

## 2019-04-22 RX ORDER — LUBIPROSTONE 24 UG/1
24 CAPSULE, GELATIN COATED ORAL 2 TIMES DAILY
Refills: 10 | COMMUNITY
Start: 2019-04-18 | End: 2020-01-06

## 2019-04-22 RX ORDER — PANTOPRAZOLE SODIUM 40 MG/1
TABLET, DELAYED RELEASE ORAL
Refills: 11 | COMMUNITY
Start: 2019-04-18 | End: 2020-12-10

## 2019-04-22 NOTE — PROGRESS NOTES
Subjective:       Patient ID: Madelyn Aguilar is a 23 y.o. female.    Chief Complaint:  Initial Prenatal Visit      History of Present Illness  HPI  Missed Menses/ Possible Pregnancy  Patient complains of amenorrhea. She believes she could be pregnant. Pregnancy is not desired. Sexual Activity: single partner, contraception: OCP (estrogen/progesterone). Current symptoms also include: positive home pregnancy test. Last period was normal.     Patient's last menstrual period was 2019 (approximate).             GYN & OB History  Patient's last menstrual period was 2019 (approximate).   Date of Last Pap: 2018    OB History    Para Term  AB Living   3 1 1 0 1 1   SAB TAB Ectopic Multiple Live Births   1 0 0 0 1      # Outcome Date GA Lbr Dusty/2nd Weight Sex Delivery Anes PTL Lv   3 Current            2 SAB            1 Term     F CS-Unspec   JAYCOB      Complications: Failure to Progress in First Stage       Review of Systems  Review of Systems   Genitourinary: Positive for menstrual problem.   All other systems reviewed and are negative.          Objective:      Physical Exam:   Constitutional: She appears well-developed.     Eyes: Pupils are equal, round, and reactive to light. Conjunctivae and EOM are normal.    Neck: Normal range of motion. Neck supple.     Pulmonary/Chest: Effort normal.        Abdominal: Soft.             Musculoskeletal: Normal range of motion.       Neurological: She is alert.    Skin: Skin is warm.    Psychiatric: She has a normal mood and affect.           Assessment:        1. Missed period               Plan:      Pt plans to self refer to [place] [facility] for termination or medical ab  Aware to start new pack the  after her procedure

## 2019-05-02 ENCOUNTER — OFFICE VISIT (OUTPATIENT)
Dept: OBSTETRICS AND GYNECOLOGY | Facility: CLINIC | Age: 24
End: 2019-05-02
Payer: MEDICAID

## 2019-05-02 VITALS
BODY MASS INDEX: 31.24 KG/M2 | DIASTOLIC BLOOD PRESSURE: 68 MMHG | SYSTOLIC BLOOD PRESSURE: 122 MMHG | HEIGHT: 62 IN | WEIGHT: 169.75 LBS

## 2019-05-02 DIAGNOSIS — N89.8 VAGINAL DISCHARGE: Primary | ICD-10-CM

## 2019-05-02 DIAGNOSIS — Z72.51 HIGH RISK HETEROSEXUAL BEHAVIOR: ICD-10-CM

## 2019-05-02 PROCEDURE — 87510 GARDNER VAG DNA DIR PROBE: CPT

## 2019-05-02 PROCEDURE — 87480 CANDIDA DNA DIR PROBE: CPT

## 2019-05-02 PROCEDURE — 87491 CHLMYD TRACH DNA AMP PROBE: CPT

## 2019-05-02 PROCEDURE — 99999 PR PBB SHADOW E&M-EST. PATIENT-LVL III: CPT | Mod: PBBFAC,,, | Performed by: OBSTETRICS & GYNECOLOGY

## 2019-05-02 PROCEDURE — 99999 PR PBB SHADOW E&M-EST. PATIENT-LVL III: ICD-10-PCS | Mod: PBBFAC,,, | Performed by: OBSTETRICS & GYNECOLOGY

## 2019-05-02 PROCEDURE — 99213 PR OFFICE/OUTPT VISIT, EST, LEVL III, 20-29 MIN: ICD-10-PCS | Mod: S$PBB,,, | Performed by: OBSTETRICS & GYNECOLOGY

## 2019-05-02 PROCEDURE — 99213 OFFICE O/P EST LOW 20 MIN: CPT | Mod: S$PBB,,, | Performed by: OBSTETRICS & GYNECOLOGY

## 2019-05-02 PROCEDURE — 99213 OFFICE O/P EST LOW 20 MIN: CPT | Mod: PBBFAC,PN | Performed by: OBSTETRICS & GYNECOLOGY

## 2019-05-02 RX ORDER — TRAMADOL HYDROCHLORIDE 50 MG/1
TABLET ORAL
Refills: 0 | COMMUNITY
Start: 2019-04-27 | End: 2019-07-23 | Stop reason: ALTCHOICE

## 2019-05-02 NOTE — PROGRESS NOTES
Subjective:       Patient ID: Madelyn Aguilar is a 23 y.o. female.    Chief Complaint:  Vaginitis      History of Present Illness  HPI  here for problem   Had medical ab on 19; started ocp on   Reports heavy bleeding that has now tapered off  No recent intercourse  C/o vaginal dsicharge    GYN & OB History  Patient's last menstrual period was 2019 (exact date).   Date of Last Pap: 2018    OB History    Para Term  AB Living   3 1 1 0 1 1   SAB TAB Ectopic Multiple Live Births   1 0 0 0 1      # Outcome Date GA Lbr Dusty/2nd Weight Sex Delivery Anes PTL Lv   3 Current            2 SAB            1 Term     F CS-Unspec   JAYCOB      Complications: Failure to Progress in First Stage       Review of Systems  Review of Systems   Genitourinary: Positive for vaginal discharge.   All other systems reviewed and are negative.          Objective:      Physical Exam:   Constitutional: She appears well-developed.     Eyes: Pupils are equal, round, and reactive to light. Conjunctivae and EOM are normal.    Neck: Normal range of motion. Neck supple.     Pulmonary/Chest: Effort normal. Right breast exhibits no mass, no nipple discharge, no skin change, no tenderness and presence. Left breast exhibits no mass, no nipple discharge, no skin change, no tenderness and presence. Breasts are symmetrical.        Abdominal: Soft.     Genitourinary: Uterus normal. Pelvic exam was performed with patient supine. Vaginal discharge (menstrual like blood in vagina) found.           Musculoskeletal: Normal range of motion.       Neurological: She is alert.    Skin: Skin is warm.    Psychiatric: She has a normal mood and affect.           Assessment:        1. Vaginal discharge    2. High risk heterosexual behavior               Plan:      Gc/ct affirm  Pelvic rest  Continue  ocp as directed  Safe sex

## 2019-05-03 LAB
BACTERIAL VAGINOSIS DNA: POSITIVE
CANDIDA GLABRATA DNA: NEGATIVE
CANDIDA KRUSEI DNA: NEGATIVE
CANDIDA RRNA VAG QL PROBE: NEGATIVE
T VAGINALIS RRNA GENITAL QL PROBE: NEGATIVE

## 2019-05-04 LAB
C TRACH DNA SPEC QL NAA+PROBE: NOT DETECTED
N GONORRHOEA DNA SPEC QL NAA+PROBE: NOT DETECTED

## 2019-05-06 ENCOUNTER — PATIENT MESSAGE (OUTPATIENT)
Dept: OBSTETRICS AND GYNECOLOGY | Facility: CLINIC | Age: 24
End: 2019-05-06

## 2019-05-06 DIAGNOSIS — N76.0 BACTERIAL VAGINOSIS: Primary | ICD-10-CM

## 2019-05-06 DIAGNOSIS — B96.89 BACTERIAL VAGINOSIS: Primary | ICD-10-CM

## 2019-05-06 RX ORDER — METRONIDAZOLE 500 MG/1
500 TABLET ORAL EVERY 12 HOURS
Qty: 14 TABLET | Refills: 0 | Status: SHIPPED | OUTPATIENT
Start: 2019-05-06 | End: 2019-05-13

## 2019-05-20 ENCOUNTER — LAB VISIT (OUTPATIENT)
Dept: LAB | Facility: HOSPITAL | Age: 24
End: 2019-05-20
Attending: NURSE PRACTITIONER
Payer: MEDICAID

## 2019-05-20 ENCOUNTER — OFFICE VISIT (OUTPATIENT)
Dept: OBSTETRICS AND GYNECOLOGY | Facility: CLINIC | Age: 24
End: 2019-05-20
Payer: MEDICAID

## 2019-05-20 VITALS
DIASTOLIC BLOOD PRESSURE: 80 MMHG | SYSTOLIC BLOOD PRESSURE: 106 MMHG | BODY MASS INDEX: 31.52 KG/M2 | HEIGHT: 62 IN | WEIGHT: 171.31 LBS

## 2019-05-20 DIAGNOSIS — R10.2 PELVIC PAIN IN FEMALE: Primary | ICD-10-CM

## 2019-05-20 DIAGNOSIS — R10.2 PELVIC PAIN IN FEMALE: ICD-10-CM

## 2019-05-20 LAB
BASOPHILS # BLD AUTO: 0.02 K/UL (ref 0–0.2)
BASOPHILS NFR BLD: 0.3 % (ref 0–1.9)
DIFFERENTIAL METHOD: ABNORMAL
EOSINOPHIL # BLD AUTO: 0.1 K/UL (ref 0–0.5)
EOSINOPHIL NFR BLD: 1.5 % (ref 0–8)
ERYTHROCYTE [DISTWIDTH] IN BLOOD BY AUTOMATED COUNT: 12.9 % (ref 11.5–14.5)
HCT VFR BLD AUTO: 38.5 % (ref 37–48.5)
HGB BLD-MCNC: 11.5 G/DL (ref 12–16)
IMM GRANULOCYTES # BLD AUTO: 0.02 K/UL (ref 0–0.04)
IMM GRANULOCYTES NFR BLD AUTO: 0.3 % (ref 0–0.5)
LYMPHOCYTES # BLD AUTO: 3 K/UL (ref 1–4.8)
LYMPHOCYTES NFR BLD: 38.7 % (ref 18–48)
MCH RBC QN AUTO: 25.3 PG (ref 27–31)
MCHC RBC AUTO-ENTMCNC: 29.9 G/DL (ref 32–36)
MCV RBC AUTO: 85 FL (ref 82–98)
MONOCYTES # BLD AUTO: 0.5 K/UL (ref 0.3–1)
MONOCYTES NFR BLD: 6.6 % (ref 4–15)
NEUTROPHILS # BLD AUTO: 4.1 K/UL (ref 1.8–7.7)
NEUTROPHILS NFR BLD: 52.6 % (ref 38–73)
NRBC BLD-RTO: 0 /100 WBC
PLATELET # BLD AUTO: 213 K/UL (ref 150–350)
PMV BLD AUTO: 13.7 FL (ref 9.2–12.9)
RBC # BLD AUTO: 4.55 M/UL (ref 4–5.4)
WBC # BLD AUTO: 7.83 K/UL (ref 3.9–12.7)

## 2019-05-20 PROCEDURE — 99999 PR PBB SHADOW E&M-EST. PATIENT-LVL II: CPT | Mod: PBBFAC,,, | Performed by: NURSE PRACTITIONER

## 2019-05-20 PROCEDURE — 87086 URINE CULTURE/COLONY COUNT: CPT

## 2019-05-20 PROCEDURE — 99214 OFFICE O/P EST MOD 30 MIN: CPT | Mod: S$PBB,,, | Performed by: NURSE PRACTITIONER

## 2019-05-20 PROCEDURE — 87491 CHLMYD TRACH DNA AMP PROBE: CPT

## 2019-05-20 PROCEDURE — 87510 GARDNER VAG DNA DIR PROBE: CPT

## 2019-05-20 PROCEDURE — 99214 PR OFFICE/OUTPT VISIT, EST, LEVL IV, 30-39 MIN: ICD-10-PCS | Mod: S$PBB,,, | Performed by: NURSE PRACTITIONER

## 2019-05-20 PROCEDURE — 84702 CHORIONIC GONADOTROPIN TEST: CPT

## 2019-05-20 PROCEDURE — 87480 CANDIDA DNA DIR PROBE: CPT

## 2019-05-20 PROCEDURE — 99999 PR PBB SHADOW E&M-EST. PATIENT-LVL II: ICD-10-PCS | Mod: PBBFAC,,, | Performed by: NURSE PRACTITIONER

## 2019-05-20 PROCEDURE — 85025 COMPLETE CBC W/AUTO DIFF WBC: CPT

## 2019-05-20 PROCEDURE — 36415 COLL VENOUS BLD VENIPUNCTURE: CPT

## 2019-05-20 PROCEDURE — 99212 OFFICE O/P EST SF 10 MIN: CPT | Mod: PBBFAC | Performed by: NURSE PRACTITIONER

## 2019-05-20 NOTE — PROGRESS NOTES
"CC:Pelvic pain     Madelyn Aguilar is a 23 y.o. female  presents for pelvic pain after EAB 4 weeks ago. No change in bowel habits, N/V or fever. No dysuria. Never followed with "  clinic". UPT in clinic was negative.       Past Medical History:   Diagnosis Date    Abnormal Pap smear of cervix     Anemia     GERD (gastroesophageal reflux disease)     Herpes simplex virus (HSV) infection     Syphilis, congenital      Past Surgical History:   Procedure Laterality Date     SECTION      LOOP ELECTROSURGICAL EXCISION PROCEDURE (LEEP)  2019    hsil on colpo     Social History     Socioeconomic History    Marital status: Single     Spouse name: Not on file    Number of children: Not on file    Years of education: Not on file    Highest education level: Not on file   Occupational History    Not on file   Social Needs    Financial resource strain: Not on file    Food insecurity:     Worry: Not on file     Inability: Not on file    Transportation needs:     Medical: Not on file     Non-medical: Not on file   Tobacco Use    Smoking status: Never Smoker    Smokeless tobacco: Never Used   Substance and Sexual Activity    Alcohol use: No    Drug use: No    Sexual activity: Yes     Partners: Male   Lifestyle    Physical activity:     Days per week: Not on file     Minutes per session: Not on file    Stress: Not on file   Relationships    Social connections:     Talks on phone: Not on file     Gets together: Not on file     Attends [employer] service: Not on file     Active member of club or organization: Not on file     Attends meetings of clubs or organizations: Not on file     Relationship status: Not on file   Other Topics Concern    Not on file   Social History Narrative    Not on file     Family History   Problem Relation Age of Onset    Hypertension Mother     Stroke Mother     Heart attack Mother     Cancer Mother         bile duct cancer    Heart murmur Father  " "    OB History        3    Para   1    Term   1       0    AB   1    Living   1       SAB   1    TAB   0    Ectopic   0    Multiple   0    Live Births   1                 /80 (BP Location: Left arm, Patient Position: Sitting, BP Method: Medium (Manual))   Ht 5' 2" (1.575 m)   Wt 77.7 kg (171 lb 4.8 oz)   LMP 05/10/2019   Breastfeeding? Unknown   BMI 31.33 kg/m²       ROS:  CHEST: Denies chest pain or shortness of breath.   CARDIOVASCULAR: Denies palpitations or left sided chest pain.   ABDOMEN:URINARY: No frequency, dysuria, hematuria, or burning on urination.HPI  REPRODUCTIVE: See HPI.       PHYSICAL EXAM:  APPEARANCE: Well nourished, well developed, in no acute distress.  CHEST: Good respiratory effect  ABDOMEN: Soft.  No tenderness or masses.  No hepatosplenomegaly.  No hernias.  PELVIC: Normal external genitalia without lesions. Vagina scant bleeding..  Cervix pink, without lesions, discharge. Slight tenderness.   Bimanual exam shows uterus to be normal size, regular, mobile and nontender.  Adnexa without masses or tenderness.    EXTREMITIES: No edema.    1. Pelvic pain in female  CBC auto differential    hCG, quantitative    Urine culture    US Pelvis Complete Non OB    C. trachomatis/N. gonorrhoeae by AMP DNA    Vaginosis Screen by DNA Probe     PLAN:  Urine, GC and Affirm cx  Pelvic ultrasound  Labs              "

## 2019-05-21 DIAGNOSIS — Z32.00 POSSIBLE PREGNANCY, NOT CONFIRMED: Primary | ICD-10-CM

## 2019-05-21 LAB
BACTERIAL VAGINOSIS DNA: POSITIVE
C TRACH DNA SPEC QL NAA+PROBE: NOT DETECTED
CANDIDA GLABRATA DNA: NEGATIVE
CANDIDA KRUSEI DNA: NEGATIVE
CANDIDA RRNA VAG QL PROBE: POSITIVE
HCG INTACT+B SERPL-ACNC: 31 MIU/ML
N GONORRHOEA DNA SPEC QL NAA+PROBE: NOT DETECTED
T VAGINALIS RRNA GENITAL QL PROBE: NEGATIVE

## 2019-05-21 NOTE — PROGRESS NOTES
Please call patient and inform her that HCG was 31. This is positive for pregnancy. Needs another HCG tomorrow.

## 2019-05-22 ENCOUNTER — LAB VISIT (OUTPATIENT)
Dept: LAB | Facility: HOSPITAL | Age: 24
End: 2019-05-22
Attending: NURSE PRACTITIONER
Payer: MEDICAID

## 2019-05-22 DIAGNOSIS — Z32.00 POSSIBLE PREGNANCY, NOT CONFIRMED: ICD-10-CM

## 2019-05-22 LAB
BACTERIA UR CULT: NORMAL
BACTERIA UR CULT: NORMAL
HCG INTACT+B SERPL-ACNC: 17 MIU/ML

## 2019-05-22 PROCEDURE — 84702 CHORIONIC GONADOTROPIN TEST: CPT

## 2019-05-22 PROCEDURE — 36415 COLL VENOUS BLD VENIPUNCTURE: CPT

## 2019-05-23 ENCOUNTER — HOSPITAL ENCOUNTER (OUTPATIENT)
Dept: RADIOLOGY | Facility: HOSPITAL | Age: 24
Discharge: HOME | End: 2019-05-23
Attending: NURSE PRACTITIONER
Payer: MEDICAID

## 2019-05-23 DIAGNOSIS — R10.2 PELVIC PAIN IN FEMALE: ICD-10-CM

## 2019-05-23 PROCEDURE — 76830 TRANSVAGINAL US NON-OB: CPT | Mod: TC

## 2019-05-23 NOTE — PROGRESS NOTES
Please call patient and inform her that HCG is decreasing ( patient is s/p EAB). She needs to proceed with ultrasound.

## 2019-05-24 NOTE — PROGRESS NOTES
Please call patient and inform her that ultrasound was normal. Please offer her a follow-up visit with MD, if she is still experiencing pelvic pain.

## 2019-06-13 ENCOUNTER — OFFICE VISIT (OUTPATIENT)
Dept: OBSTETRICS AND GYNECOLOGY | Facility: CLINIC | Age: 24
End: 2019-06-13
Payer: MEDICAID

## 2019-06-13 ENCOUNTER — TELEPHONE (OUTPATIENT)
Dept: OBSTETRICS AND GYNECOLOGY | Facility: CLINIC | Age: 24
End: 2019-06-13

## 2019-06-13 VITALS
BODY MASS INDEX: 31.32 KG/M2 | DIASTOLIC BLOOD PRESSURE: 62 MMHG | WEIGHT: 170.19 LBS | HEIGHT: 62 IN | SYSTOLIC BLOOD PRESSURE: 110 MMHG

## 2019-06-13 DIAGNOSIS — R14.0 ABDOMINAL BLOATING: ICD-10-CM

## 2019-06-13 DIAGNOSIS — N92.1 BREAKTHROUGH BLEEDING ON OCPS: ICD-10-CM

## 2019-06-13 DIAGNOSIS — R10.2 PELVIC PAIN IN FEMALE: Primary | ICD-10-CM

## 2019-06-13 PROCEDURE — 99999 PR PBB SHADOW E&M-EST. PATIENT-LVL III: ICD-10-PCS | Mod: PBBFAC,,, | Performed by: OBSTETRICS & GYNECOLOGY

## 2019-06-13 PROCEDURE — 99999 PR PBB SHADOW E&M-EST. PATIENT-LVL III: CPT | Mod: PBBFAC,,, | Performed by: OBSTETRICS & GYNECOLOGY

## 2019-06-13 PROCEDURE — 99213 OFFICE O/P EST LOW 20 MIN: CPT | Mod: PBBFAC,PN | Performed by: OBSTETRICS & GYNECOLOGY

## 2019-06-13 PROCEDURE — 99213 PR OFFICE/OUTPT VISIT, EST, LEVL III, 20-29 MIN: ICD-10-PCS | Mod: S$PBB,,, | Performed by: OBSTETRICS & GYNECOLOGY

## 2019-06-13 PROCEDURE — 99213 OFFICE O/P EST LOW 20 MIN: CPT | Mod: S$PBB,,, | Performed by: OBSTETRICS & GYNECOLOGY

## 2019-06-13 NOTE — PROGRESS NOTES
Subjective:       Patient ID: Madelyn Aguilar is a 23 y.o. female.    Chief Complaint:  Pelvic Pain and Metrorrhagia      History of Present Illness  HPI  here for problem   Reports she continues to have irreg bleeding on ocp--sometimes passing clots, sometimes having spotting  Also c/o abdominal bloating and pelvic pain has resumed despite use of amitiza and ocp  Adamantly does not want any further children    Patient is aware that I strongly feel she may continue to have pelvic pain and abdominal bloating after uterus is removed.  Aware pelvic sono is normal  Pt refused to try 3 mo ocp, depo, mirena , nexplanon   Patient wishes to proceed  GYN & OB History  Patient's last menstrual period was 2019 (approximate).   Date of Last Pap: 2018    OB History    Para Term  AB Living   3 1 1 0 1 1   SAB TAB Ectopic Multiple Live Births   1 0 0 0 1      # Outcome Date GA Lbr Dusty/2nd Weight Sex Delivery Anes PTL Lv   3             2 SAB            1 Term     F CS-Unspec   JAYCOB      Complications: Failure to Progress in First Stage       Review of Systems  Review of Systems   Genitourinary: Positive for menstrual problem and pelvic pain.   All other systems reviewed and are negative.          Objective:      Physical Exam:   Constitutional: She appears well-developed.     Eyes: Pupils are equal, round, and reactive to light. Conjunctivae and EOM are normal.    Neck: Normal range of motion. Neck supple.     Pulmonary/Chest: Effort normal.        Abdominal: Soft.             Musculoskeletal: Normal range of motion.       Neurological: She is alert.    Skin: Skin is warm.    Psychiatric: She has a normal mood and affect.           Assessment:     Encounter Diagnoses   Name Primary?    Pelvic pain in female Yes    Breakthrough bleeding on OCPs     Abdominal bloating                Plan:      Hysterectomy (insurance) consent signed  Case request submitted for [facility]  Agrees to continue ocp  for now

## 2019-06-13 NOTE — TELEPHONE ENCOUNTER
----- Message from Howie Alcantara sent at 6/13/2019 10:14 AM CDT -----  Contact: Patient   Patient will not make the appointment at 1045 today because of car troubles and would like a later appointment for today    [phone]    Thank you

## 2019-06-13 NOTE — TELEPHONE ENCOUNTER
Returned call to patient, she requested a later appt today d/t car trouble, per patient.  Appt scheduled today at 3:45 pm, she confirmed appt

## 2019-06-27 ENCOUNTER — TELEPHONE (OUTPATIENT)
Dept: OBSTETRICS AND GYNECOLOGY | Facility: CLINIC | Age: 24
End: 2019-06-27

## 2019-06-27 NOTE — TELEPHONE ENCOUNTER
Spoke with pt and informed her that a surgery date had not be scheduled. Informed pt that  is on vacation and that Milka will call her to schedule her surgery.  Pt verbalized understanding.

## 2019-06-27 NOTE — TELEPHONE ENCOUNTER
----- Message from Kenyatta Martinez sent at 6/27/2019 10:57 AM CDT -----  Contact: PT   Pt was calling to get a surgery date.    Pt would like a call back at [phone]    Thank You.

## 2019-07-08 RX ORDER — ACYCLOVIR 400 MG/1
TABLET ORAL
Qty: 30 TABLET | Refills: 11 | Status: SHIPPED | OUTPATIENT
Start: 2019-07-08 | End: 2020-12-10

## 2019-07-11 ENCOUNTER — PATIENT MESSAGE (OUTPATIENT)
Dept: OBSTETRICS AND GYNECOLOGY | Facility: CLINIC | Age: 24
End: 2019-07-11

## 2019-07-12 DIAGNOSIS — R10.2 PELVIC PAIN IN FEMALE: Primary | ICD-10-CM

## 2019-07-23 ENCOUNTER — HOSPITAL ENCOUNTER (OUTPATIENT)
Dept: PREADMISSION TESTING | Facility: HOSPITAL | Age: 24
Discharge: HOME | End: 2019-07-23
Attending: OBSTETRICS & GYNECOLOGY
Payer: MEDICAID

## 2019-07-23 ENCOUNTER — OFFICE VISIT (OUTPATIENT)
Dept: OBSTETRICS AND GYNECOLOGY | Facility: CLINIC | Age: 24
End: 2019-07-23
Payer: MEDICAID

## 2019-07-23 VITALS
BODY MASS INDEX: 32.14 KG/M2 | WEIGHT: 174.63 LBS | SYSTOLIC BLOOD PRESSURE: 110 MMHG | DIASTOLIC BLOOD PRESSURE: 60 MMHG | HEIGHT: 62 IN

## 2019-07-23 VITALS — WEIGHT: 170 LBS | BODY MASS INDEX: 28.32 KG/M2 | HEIGHT: 65 IN

## 2019-07-23 DIAGNOSIS — R10.2 PELVIC PAIN: Primary | ICD-10-CM

## 2019-07-23 DIAGNOSIS — N92.1 BREAKTHROUGH BLEEDING ON OCPS: ICD-10-CM

## 2019-07-23 PROCEDURE — 99999 PR PBB SHADOW E&M-EST. PATIENT-LVL III: ICD-10-PCS | Mod: PBBFAC,,, | Performed by: OBSTETRICS & GYNECOLOGY

## 2019-07-23 PROCEDURE — 99213 OFFICE O/P EST LOW 20 MIN: CPT | Mod: S$PBB,,, | Performed by: OBSTETRICS & GYNECOLOGY

## 2019-07-23 PROCEDURE — 99213 OFFICE O/P EST LOW 20 MIN: CPT | Mod: PBBFAC | Performed by: OBSTETRICS & GYNECOLOGY

## 2019-07-23 PROCEDURE — 99999 PR PBB SHADOW E&M-EST. PATIENT-LVL III: CPT | Mod: PBBFAC,,, | Performed by: OBSTETRICS & GYNECOLOGY

## 2019-07-23 PROCEDURE — 99213 PR OFFICE/OUTPT VISIT, EST, LEVL III, 20-29 MIN: ICD-10-PCS | Mod: S$PBB,,, | Performed by: OBSTETRICS & GYNECOLOGY

## 2019-07-23 NOTE — PROGRESS NOTES
History & Physical    SUBJECTIVE:     History of Present Illness:  Patient is a 23 y.o. female presents with 1+ year of pelvic pain and abdominal bloating.  Also reports irregular bleeding after trying different types of ocp.  She adamantly does not desire further fertility.  She feels strongly that the bleeding is related to her uterus.. .  Pelvic sono has been normal  Hx of hsil for which she had a leep    Chief Complaint   Patient presents with    Pre-op Exam       Review of patient's allergies indicates:   Allergen Reactions    Fish containing products Anaphylaxis    Sudafed [pseudoephedrine hcl] Other (See Comments)     Tight knot on her neck       Current Outpatient Medications   Medication Sig Dispense Refill    acyclovir (ZOVIRAX) 400 MG tablet TAKE 1 TABLET BY MOUTH EVERY DAY 30 tablet 11    AMITIZA 24 mcg Cap Take 24 mcg by mouth 2 (two) times daily.  10    clotrimazole-betamethasone 1-0.05% (LOTRISONE) cream Apply to affected area 2 times daily (Patient taking differently: daily as needed. Apply to affected area 2 times daily) 15 g 1    hydrocodone-acetaminophen 7.5-325mg (NORCO) 7.5-325 mg per tablet Take 1 tablet by mouth every 4 to 6 hours as needed.  0    ibuprofen (ADVIL,MOTRIN) 600 MG tablet Take 1 tablet (600 mg total) by mouth every 6 (six) hours as needed. 20 tablet 0    multivitamin capsule Take 1 capsule by mouth once daily.      norgestimate-ethinyl estradiol (ORTHO TRI-CYCLEN,TRI-SPRINTEC) 0.18/0.215/0.25 mg-35 mcg (28) tablet TAKE 1 TABLET BY MOUTH ONCE DAILY. (Patient taking differently: Take 1 tablet by mouth every evening. ) 28 tablet 9    pantoprazole (PROTONIX) 40 MG tablet TAKE 1 TABLET BY MOUTH 30 MINUTES BEFORE BREAKFAST DAILY  11    traMADol (ULTRAM) 50 mg tablet TK 1 T PO PRN Q 6 TO 8 H FOR CRAMPS  0     No current facility-administered medications for this visit.        Past Medical History:   Diagnosis Date    Abnormal Pap smear of cervix     Anemia     Arthritis   "   pau arms/legs    GERD (gastroesophageal reflux disease)     Herpes simplex virus (HSV) infection     Syphilis, congenital      Past Surgical History:   Procedure Laterality Date     SECTION  2013    LOOP ELECTROSURGICAL EXCISION PROCEDURE (LEEP)  2019    hsil on colpo     Family History   Problem Relation Age of Onset    Hypertension Mother     Stroke Mother     Heart attack Mother     Cancer Mother         bile duct cancer    Heart murmur Father      Social History     Tobacco Use    Smoking status: Never Smoker    Smokeless tobacco: Never Used   Substance Use Topics    Alcohol use: No    Drug use: No        Review of Systems:  Review of Systems   Gastrointestinal: Positive for abdominal distention, abdominal pain and constipation.   Genitourinary: Positive for pelvic pain.       OBJECTIVE:     Vital Signs (Most Recent)  BP: 110/60 (19 1323)  5' 2" (1.575 m)  79.2 kg (174 lb 9.7 oz)     Physical Exam:  Physical Exam   Constitutional: She is oriented to person, place, and time. She appears well-developed.   HENT:   Head: Normocephalic.   Eyes: Pupils are equal, round, and reactive to light. Conjunctivae and EOM are normal.   Neck: Normal range of motion.   Cardiovascular: Normal rate.   Pulmonary/Chest: Effort normal and breath sounds normal.   Abdominal: Soft. Bowel sounds are normal. She exhibits no distension and no mass. There is no rebound and no guarding.   Musculoskeletal: Normal range of motion.   Neurological: She is alert and oriented to person, place, and time.   Skin: Skin is warm and dry.   Psychiatric: She has a normal mood and affect. Her behavior is normal. Judgment and thought content normal.   Vitals reviewed.      Laboratory  cbc, cmp, hcg to be drawn preop    Diagnostic Results:  see results  The uterus measures 9.4 cm in craniocaudal dimension by 4.5 cm in AP dimension by 5.0 cm in mediolateral dimension.  The endometrial stripe thickness measures 4 mm. "  There is a 3 mm anechoic cyst in the cervical portion of the uterus.    The ovaries are normal in appearance.  The left ovary measures 3.8 cm by 1.4 cm x 2.5 cm.  It has arterial flow with a peak systolic velocity of 17 centimeters/second.  The right ovary measures 3.8 cm x 1.3 cm x 1.8 cm.  It has arterial flow with a peak systolic velocity of 12 centimeters/second.    The urinary bladder is normal in appearance.  There is no free fluid visualized within the pelvis.      Impression       1. There is a 3 mm anechoic cyst in the cervical portion of the uterus.  This is characteristic of a nabothian cyst.  2. The ovaries and urinary bladder are normal in appearance       ASSESSMENT/PLAN:     Encounter Diagnoses   Name Primary?    Pelvic pain Yes    Breakthrough bleeding on OCPs          PLAN:Plan     Reviewed robotic assisted laparoscopic hysterectomy with bilateral salpingectomy procedure in detail.    Reviewed risks including but not limited to infection, bleeding, damage to bowel/bladder, cva;htn, death; Patient had been made aware that she may continue to have pelvic pain and abdominal bloating.   Pt aware procedure will render her unable to have children.     All questions answered to the best of my ability.  Alternatives reviewed --nsaid, depo, mirena, nuva ring, nexplanon.  Consents signed and witnessed.  Reviewed post op course.  Post op appts made.  Patient plans to stay overnight.

## 2019-07-23 NOTE — DISCHARGE INSTRUCTIONS
To confirm, Your doctor has instructed you that surgery is scheduled for 07/31/19 at  07:00am.       Please report to [facility], [address], 1st floor, main lobby by 05:30am.  Pre admit office will call afternoon prior to surgery with final arrival time    INSTRUCTIONS IMPORTANT!!!   Do not eat or smoke after 12 midnight, may have water/apple juice 3 hours prior to surgery. OK to brush teeth, no gum, candy or mints!    ¨ Take only these medicines with a small swallow of water-morning of surgery.  protonix    Pre operative instructions:  Please review the Pre-Operative Instruction booklet that you were given.        Bathing Instructions--See page 6 in the Pre-operative booklet.      Prevention of surgical site infections:     -Keep incisions clean and dry.   -Do not soak/submerge incisions in water until completely healed.   -Do not apply lotions, powders, creams, or deodorants to site.   -Always make sure hands are cleaned with antibacterial soap/ alcohol-based                 prior to touching the surgical site.  (This includes doctors,                 nurses, staff, and yourself.)    Signs and symptoms:   -Redness and pain around the area where you had surgery   -Drainage of cloudy fluid from your surgical wound   -Fever over 100.4       I have read or had read and explained to me, and understand the above information.  Additional comments or instructions:  Received a copy of Pre-operative instructions booklet, FAQ surgical site infection sheet, and packets of hibiclens (if indicated).

## 2019-07-26 ENCOUNTER — ANESTHESIA EVENT (OUTPATIENT)
Dept: SURGERY | Facility: HOSPITAL | Age: 24
End: 2019-07-26
Payer: MEDICAID

## 2019-07-31 ENCOUNTER — ANESTHESIA (OUTPATIENT)
Dept: SURGERY | Facility: HOSPITAL | Age: 24
End: 2019-07-31
Payer: MEDICAID

## 2019-07-31 ENCOUNTER — HOSPITAL ENCOUNTER (OUTPATIENT)
Facility: HOSPITAL | Age: 24
Discharge: HOME | End: 2019-07-31
Attending: OBSTETRICS & GYNECOLOGY | Admitting: OBSTETRICS & GYNECOLOGY
Payer: MEDICAID

## 2019-07-31 DIAGNOSIS — Z90.710 S/P LAPAROSCOPIC HYSTERECTOMY: ICD-10-CM

## 2019-07-31 DIAGNOSIS — N92.1 MENOMETRORRHAGIA: ICD-10-CM

## 2019-07-31 DIAGNOSIS — R10.2 PELVIC PAIN: Primary | ICD-10-CM

## 2019-07-31 LAB
B-HCG UR QL: NEGATIVE
CTP QC/QA: YES
POCT GLUCOSE: 84 MG/DL (ref 70–110)

## 2019-07-31 PROCEDURE — 63600175 PHARM REV CODE 636 W HCPCS: Performed by: ANESTHESIOLOGY

## 2019-07-31 PROCEDURE — 88307 TISSUE EXAM BY PATHOLOGIST: CPT | Mod: 26,,, | Performed by: PATHOLOGY

## 2019-07-31 PROCEDURE — 71000015 HC POSTOP RECOV 1ST HR: Performed by: OBSTETRICS & GYNECOLOGY

## 2019-07-31 PROCEDURE — 63600175 PHARM REV CODE 636 W HCPCS: Performed by: CLINIC/CENTER

## 2019-07-31 PROCEDURE — 88307 TISSUE SPECIMEN TO PATHOLOGY - SURGERY: ICD-10-PCS | Mod: 26,,, | Performed by: PATHOLOGY

## 2019-07-31 PROCEDURE — 58571 PR LAPAROSCOPY W TOT HYSTERECTUTERUS <=250 GRAM  W TUBE/OVARY: ICD-10-PCS | Mod: ,,, | Performed by: OBSTETRICS & GYNECOLOGY

## 2019-07-31 PROCEDURE — 25000003 PHARM REV CODE 250: Performed by: OBSTETRICS & GYNECOLOGY

## 2019-07-31 PROCEDURE — 00840 ANES IPER PX LOWER ABD NOS: CPT | Performed by: OBSTETRICS & GYNECOLOGY

## 2019-07-31 PROCEDURE — 25000003 PHARM REV CODE 250: Performed by: CLINIC/CENTER

## 2019-07-31 PROCEDURE — 36000713 HC OR TIME LEV V EA ADD 15 MIN: Performed by: OBSTETRICS & GYNECOLOGY

## 2019-07-31 PROCEDURE — 37000009 HC ANESTHESIA EA ADD 15 MINS: Performed by: OBSTETRICS & GYNECOLOGY

## 2019-07-31 PROCEDURE — 81025 URINE PREGNANCY TEST: CPT | Performed by: OBSTETRICS & GYNECOLOGY

## 2019-07-31 PROCEDURE — 27201423 OPTIME MED/SURG SUP & DEVICES STERILE SUPPLY: Performed by: OBSTETRICS & GYNECOLOGY

## 2019-07-31 PROCEDURE — 37000008 HC ANESTHESIA 1ST 15 MINUTES: Performed by: OBSTETRICS & GYNECOLOGY

## 2019-07-31 PROCEDURE — 71000016 HC POSTOP RECOV ADDL HR: Performed by: OBSTETRICS & GYNECOLOGY

## 2019-07-31 PROCEDURE — 71000039 HC RECOVERY, EACH ADD'L HOUR: Performed by: OBSTETRICS & GYNECOLOGY

## 2019-07-31 PROCEDURE — 71000033 HC RECOVERY, INTIAL HOUR: Performed by: OBSTETRICS & GYNECOLOGY

## 2019-07-31 PROCEDURE — 63600175 PHARM REV CODE 636 W HCPCS: Performed by: OBSTETRICS & GYNECOLOGY

## 2019-07-31 PROCEDURE — 58571 TLH W/T/O 250 G OR LESS: CPT | Mod: ,,, | Performed by: OBSTETRICS & GYNECOLOGY

## 2019-07-31 PROCEDURE — C2628 CATHETER, OCCLUSION: HCPCS | Performed by: OBSTETRICS & GYNECOLOGY

## 2019-07-31 PROCEDURE — 36000712 HC OR TIME LEV V 1ST 15 MIN: Performed by: OBSTETRICS & GYNECOLOGY

## 2019-07-31 PROCEDURE — 88307 TISSUE EXAM BY PATHOLOGIST: CPT | Performed by: PATHOLOGY

## 2019-07-31 RX ORDER — LUBIPROSTONE 24 UG/1
24 CAPSULE ORAL 2 TIMES DAILY
Status: DISCONTINUED | OUTPATIENT
Start: 2019-07-31 | End: 2019-07-31 | Stop reason: HOSPADM

## 2019-07-31 RX ORDER — HYDROMORPHONE HYDROCHLORIDE 2 MG/ML
0.2 INJECTION, SOLUTION INTRAMUSCULAR; INTRAVENOUS; SUBCUTANEOUS EVERY 5 MIN PRN
Status: DISCONTINUED | OUTPATIENT
Start: 2019-07-31 | End: 2019-07-31 | Stop reason: HOSPADM

## 2019-07-31 RX ORDER — MIDAZOLAM HYDROCHLORIDE 1 MG/ML
INJECTION, SOLUTION INTRAMUSCULAR; INTRAVENOUS
Status: DISCONTINUED | OUTPATIENT
Start: 2019-07-31 | End: 2019-07-31

## 2019-07-31 RX ORDER — SUCCINYLCHOLINE CHLORIDE 20 MG/ML
INJECTION INTRAMUSCULAR; INTRAVENOUS
Status: DISCONTINUED | OUTPATIENT
Start: 2019-07-31 | End: 2019-07-31

## 2019-07-31 RX ORDER — PROPOFOL 10 MG/ML
VIAL (ML) INTRAVENOUS
Status: DISCONTINUED | OUTPATIENT
Start: 2019-07-31 | End: 2019-07-31

## 2019-07-31 RX ORDER — KETOROLAC TROMETHAMINE 30 MG/ML
30 INJECTION, SOLUTION INTRAMUSCULAR; INTRAVENOUS ONCE
Status: COMPLETED | OUTPATIENT
Start: 2019-07-31 | End: 2019-07-31

## 2019-07-31 RX ORDER — MEPERIDINE HYDROCHLORIDE 50 MG/ML
12.5 INJECTION INTRAMUSCULAR; INTRAVENOUS; SUBCUTANEOUS ONCE AS NEEDED
Status: DISCONTINUED | OUTPATIENT
Start: 2019-07-31 | End: 2019-07-31 | Stop reason: HOSPADM

## 2019-07-31 RX ORDER — FENTANYL CITRATE 50 UG/ML
INJECTION, SOLUTION INTRAMUSCULAR; INTRAVENOUS
Status: DISCONTINUED | OUTPATIENT
Start: 2019-07-31 | End: 2019-07-31

## 2019-07-31 RX ORDER — BISACODYL 10 MG
10 SUPPOSITORY, RECTAL RECTAL DAILY PRN
Status: DISCONTINUED | OUTPATIENT
Start: 2019-07-31 | End: 2019-07-31 | Stop reason: HOSPADM

## 2019-07-31 RX ORDER — AMOXICILLIN 250 MG
1 CAPSULE ORAL 2 TIMES DAILY
Status: DISCONTINUED | OUTPATIENT
Start: 2019-07-31 | End: 2019-07-31 | Stop reason: HOSPADM

## 2019-07-31 RX ORDER — HYDROCODONE BITARTRATE AND ACETAMINOPHEN 5; 325 MG/1; MG/1
1 TABLET ORAL EVERY 4 HOURS PRN
Qty: 14 TABLET | Refills: 0 | Status: SHIPPED | OUTPATIENT
Start: 2019-07-31 | End: 2019-08-13

## 2019-07-31 RX ORDER — ONDANSETRON 8 MG/1
8 TABLET, ORALLY DISINTEGRATING ORAL EVERY 8 HOURS PRN
Status: DISCONTINUED | OUTPATIENT
Start: 2019-07-31 | End: 2019-07-31 | Stop reason: HOSPADM

## 2019-07-31 RX ORDER — GLYCOPYRROLATE 0.2 MG/ML
INJECTION INTRAMUSCULAR; INTRAVENOUS
Status: DISCONTINUED | OUTPATIENT
Start: 2019-07-31 | End: 2019-07-31

## 2019-07-31 RX ORDER — METOCLOPRAMIDE HYDROCHLORIDE 5 MG/ML
10 INJECTION INTRAMUSCULAR; INTRAVENOUS EVERY 10 MIN PRN
Status: DISCONTINUED | OUTPATIENT
Start: 2019-07-31 | End: 2019-07-31 | Stop reason: HOSPADM

## 2019-07-31 RX ORDER — KETOROLAC TROMETHAMINE 30 MG/ML
30 INJECTION, SOLUTION INTRAMUSCULAR; INTRAVENOUS EVERY 6 HOURS
Status: DISCONTINUED | OUTPATIENT
Start: 2019-07-31 | End: 2019-07-31 | Stop reason: HOSPADM

## 2019-07-31 RX ORDER — NEOSTIGMINE METHYLSULFATE 1 MG/ML
INJECTION, SOLUTION INTRAVENOUS
Status: DISCONTINUED | OUTPATIENT
Start: 2019-07-31 | End: 2019-07-31

## 2019-07-31 RX ORDER — ACYCLOVIR 400 MG/1
400 TABLET ORAL DAILY
Status: DISCONTINUED | OUTPATIENT
Start: 2019-07-31 | End: 2019-07-31 | Stop reason: HOSPADM

## 2019-07-31 RX ORDER — ACETAMINOPHEN 10 MG/ML
1000 INJECTION, SOLUTION INTRAVENOUS ONCE
Status: COMPLETED | OUTPATIENT
Start: 2019-07-31 | End: 2019-07-31

## 2019-07-31 RX ORDER — HYDROMORPHONE HYDROCHLORIDE 2 MG/ML
1 INJECTION, SOLUTION INTRAMUSCULAR; INTRAVENOUS; SUBCUTANEOUS EVERY 4 HOURS PRN
Status: DISCONTINUED | OUTPATIENT
Start: 2019-07-31 | End: 2019-07-31 | Stop reason: HOSPADM

## 2019-07-31 RX ORDER — ROCURONIUM BROMIDE 10 MG/ML
INJECTION, SOLUTION INTRAVENOUS
Status: DISCONTINUED | OUTPATIENT
Start: 2019-07-31 | End: 2019-07-31

## 2019-07-31 RX ORDER — DIPHENHYDRAMINE HYDROCHLORIDE 50 MG/ML
25 INJECTION INTRAMUSCULAR; INTRAVENOUS EVERY 6 HOURS PRN
Status: DISCONTINUED | OUTPATIENT
Start: 2019-07-31 | End: 2019-07-31 | Stop reason: HOSPADM

## 2019-07-31 RX ORDER — SODIUM CHLORIDE, SODIUM LACTATE, POTASSIUM CHLORIDE, CALCIUM CHLORIDE 600; 310; 30; 20 MG/100ML; MG/100ML; MG/100ML; MG/100ML
INJECTION, SOLUTION INTRAVENOUS CONTINUOUS
Status: DISCONTINUED | OUTPATIENT
Start: 2019-07-31 | End: 2019-07-31 | Stop reason: HOSPADM

## 2019-07-31 RX ORDER — DIPHENHYDRAMINE HCL 25 MG
25 CAPSULE ORAL EVERY 4 HOURS PRN
Status: DISCONTINUED | OUTPATIENT
Start: 2019-07-31 | End: 2019-07-31 | Stop reason: HOSPADM

## 2019-07-31 RX ORDER — LIDOCAINE HCL/PF 100 MG/5ML
SYRINGE (ML) INTRAVENOUS
Status: DISCONTINUED | OUTPATIENT
Start: 2019-07-31 | End: 2019-07-31

## 2019-07-31 RX ORDER — ONDANSETRON 2 MG/ML
INJECTION INTRAMUSCULAR; INTRAVENOUS
Status: DISCONTINUED | OUTPATIENT
Start: 2019-07-31 | End: 2019-07-31

## 2019-07-31 RX ORDER — DEXAMETHASONE SODIUM PHOSPHATE 4 MG/ML
INJECTION, SOLUTION INTRA-ARTICULAR; INTRALESIONAL; INTRAMUSCULAR; INTRAVENOUS; SOFT TISSUE
Status: DISCONTINUED | OUTPATIENT
Start: 2019-07-31 | End: 2019-07-31

## 2019-07-31 RX ORDER — IBUPROFEN 600 MG/1
600 TABLET ORAL EVERY 6 HOURS
Status: DISCONTINUED | OUTPATIENT
Start: 2019-08-01 | End: 2019-07-31 | Stop reason: HOSPADM

## 2019-07-31 RX ORDER — SODIUM CHLORIDE, SODIUM LACTATE, POTASSIUM CHLORIDE, CALCIUM CHLORIDE 600; 310; 30; 20 MG/100ML; MG/100ML; MG/100ML; MG/100ML
INJECTION, SOLUTION INTRAVENOUS CONTINUOUS PRN
Status: DISCONTINUED | OUTPATIENT
Start: 2019-07-31 | End: 2019-07-31

## 2019-07-31 RX ORDER — HYDROCODONE BITARTRATE AND ACETAMINOPHEN 5; 325 MG/1; MG/1
1 TABLET ORAL EVERY 4 HOURS PRN
Status: DISCONTINUED | OUTPATIENT
Start: 2019-07-31 | End: 2019-07-31 | Stop reason: HOSPADM

## 2019-07-31 RX ORDER — HYDROCODONE BITARTRATE AND ACETAMINOPHEN 7.5; 325 MG/1; MG/1
1 TABLET ORAL EVERY 4 HOURS PRN
Status: DISCONTINUED | OUTPATIENT
Start: 2019-07-31 | End: 2019-07-31 | Stop reason: HOSPADM

## 2019-07-31 RX ORDER — CEFAZOLIN SODIUM 2 G/50ML
2 SOLUTION INTRAVENOUS
Status: COMPLETED | OUTPATIENT
Start: 2019-07-31 | End: 2019-07-31

## 2019-07-31 RX ORDER — IBUPROFEN 800 MG/1
800 TABLET ORAL EVERY 8 HOURS PRN
Qty: 30 TABLET | Refills: 0 | Status: SHIPPED | OUTPATIENT
Start: 2019-07-31 | End: 2019-08-10

## 2019-07-31 RX ORDER — SODIUM CHLORIDE 0.9 % (FLUSH) 0.9 %
3 SYRINGE (ML) INJECTION
Status: DISCONTINUED | OUTPATIENT
Start: 2019-07-31 | End: 2019-07-31 | Stop reason: HOSPADM

## 2019-07-31 RX ADMIN — ONDANSETRON 4 MG: 2 INJECTION, SOLUTION INTRAMUSCULAR; INTRAVENOUS at 07:07

## 2019-07-31 RX ADMIN — LIDOCAINE HYDROCHLORIDE 60 MG: 20 INJECTION, SOLUTION INTRAVENOUS at 07:07

## 2019-07-31 RX ADMIN — ACETAMINOPHEN 1000 MG: 10 INJECTION, SOLUTION INTRAVENOUS at 12:07

## 2019-07-31 RX ADMIN — ROBINUL 0.5 MG: 0.2 INJECTION INTRAMUSCULAR; INTRAVENOUS at 08:07

## 2019-07-31 RX ADMIN — SUCCINYLCHOLINE CHLORIDE 80 MG: 20 INJECTION, SOLUTION INTRAMUSCULAR; INTRAVENOUS at 07:07

## 2019-07-31 RX ADMIN — HYDROCODONE BITARTRATE AND ACETAMINOPHEN 1 TABLET: 7.5; 325 TABLET ORAL at 11:07

## 2019-07-31 RX ADMIN — ROCURONIUM BROMIDE 5 MG: 10 INJECTION, SOLUTION INTRAVENOUS at 07:07

## 2019-07-31 RX ADMIN — DEXAMETHASONE SODIUM PHOSPHATE 4 MG: 4 INJECTION, SOLUTION INTRA-ARTICULAR; INTRALESIONAL; INTRAMUSCULAR; INTRAVENOUS; SOFT TISSUE at 07:07

## 2019-07-31 RX ADMIN — PROPOFOL 130 MG: 10 INJECTION, EMULSION INTRAVENOUS at 07:07

## 2019-07-31 RX ADMIN — NEOSTIGMINE METHYLSULFATE 5 MG: 1 INJECTION INTRAVENOUS at 08:07

## 2019-07-31 RX ADMIN — ROCURONIUM BROMIDE 35 MG: 10 INJECTION, SOLUTION INTRAVENOUS at 07:07

## 2019-07-31 RX ADMIN — MIDAZOLAM 2 MG: 1 INJECTION INTRAMUSCULAR; INTRAVENOUS at 06:07

## 2019-07-31 RX ADMIN — FENTANYL CITRATE 100 MCG: 50 INJECTION, SOLUTION INTRAMUSCULAR; INTRAVENOUS at 07:07

## 2019-07-31 RX ADMIN — SODIUM CHLORIDE, SODIUM LACTATE, POTASSIUM CHLORIDE, AND CALCIUM CHLORIDE: 600; 310; 30; 20 INJECTION, SOLUTION INTRAVENOUS at 06:07

## 2019-07-31 RX ADMIN — FENTANYL CITRATE 50 MCG: 50 INJECTION, SOLUTION INTRAMUSCULAR; INTRAVENOUS at 08:07

## 2019-07-31 RX ADMIN — CEFAZOLIN SODIUM 2 G: 2 SOLUTION INTRAVENOUS at 06:07

## 2019-07-31 RX ADMIN — KETOROLAC TROMETHAMINE 30 MG: 30 INJECTION, SOLUTION INTRAMUSCULAR at 12:07

## 2019-07-31 NOTE — BRIEF OP NOTE
[facility] -   Brief Operative Note     SUMMARY     Surgery Date: 7/31/2019     Surgeon(s) and Role:     * Norma Chen MD - Primary    Assisting Surgeon: None    Pre-op Diagnosis:  Pelvic pain in female [R10.2]    Post-op Diagnosis:  Post-Op Diagnosis Codes:     * Pelvic pain in female [R10.2]    Procedure(s) (LRB):  XI ROBOTIC HYSTERECTOMY (N/A)  XI ROBOTIC SALPINGECTOMY (Bilateral)    Anesthesia: General    Description of the findings of the procedure: see operative note for details    Findings/Key Components: 10 wk boggy uterus; normal adnexa bilaterally    Estimated Blood Loss: 10 mL         Specimens:   Specimen (12h ago, onward)    Start     Ordered    07/31/19 0740  Specimen to Pathology - Surgery  Once     Comments:  Pre-op Diagnosis: Pelvic pain in female [R10.2]Procedure(s):XI ROBOTIC HYSTERECTOMYXI ROBOTIC SALPINGECTOMY Name of specimens: 1) Cervix, uterus, bilateral fallopian tubes PERM     Start Status     07/31/19 0740 In process Order ID: [id number]       07/31/19 0823          Discharge Note    SUMMARY     Admit Date: 7/31/2019    Discharge Date and Time:  07/31/2019 1:06 PM    Hospital Course (synopsis of major diagnoses, care, treatment, and services provided during the course of the hospital stay): Patient underwent robotic assisted laparoscopic hysterectomy with bilateral salpingectomy for pelvic pain and menometrorrhagia.  She tolerated the procedure well and was stable for discharge from the pacu.      Final Diagnosis: Post-Op Diagnosis Codes:     * Pelvic pain in female [R10.2]    Disposition: Home or Self Care    Follow Up/Patient Instructions:   Pelvic rest x 12 weeks (no tampons, intercourse douching); showers only for 6 wks; no lifting more than 10-15lbs    Medications:  Reconciled Home Medications:      Medication List      START taking these medications    HYDROcodone-acetaminophen 5-325 mg per tablet  Commonly known as:  NORCO  Take 1 tablet by mouth every 4 (four)  hours as needed for Pain.        CHANGE how you take these medications    clotrimazole-betamethasone 1-0.05% cream  Commonly known as:  LOTRISONE  Apply to affected area 2 times daily  What changed:    · when to take this  · reasons to take this  · additional instructions     ibuprofen 800 MG tablet  Commonly known as:  ADVILMOTRIN  Take 1 tablet (800 mg total) by mouth every 8 (eight) hours as needed for Pain.  What changed:    · medication strength  · how much to take  · when to take this  · reasons to take this        CONTINUE taking these medications    acyclovir 400 MG tablet  Commonly known as:  ZOVIRAX  TAKE 1 TABLET BY MOUTH EVERY DAY     AMITIZA 24 MCG Cap  Generic drug:  lubiprostone  Take 24 mcg by mouth 2 (two) times daily.     multivitamin capsule  Take 1 capsule by mouth once daily.     pantoprazole 40 MG tablet  Commonly known as:  PROTONIX  TAKE 1 TABLET BY MOUTH 30 MINUTES BEFORE BREAKFAST DAILY        STOP taking these medications    norgestimate-ethinyl estradiol 0.18/0.215/0.25 mg-35 mcg (28) tablet  Commonly known as:  ORTHO TRI-CYCLEN,TRI-SPRINTEC          Discharge Procedure Orders   Diet Adult Regular     Call MD for:  temperature >100.4     Call MD for:  persistent nausea and vomiting or diarrhea     Call MD for:  severe uncontrolled pain     Call MD for:  redness, tenderness, or signs of infection (pain, swelling, redness, odor or green/yellow discharge around incision site)     Call MD for:  difficulty breathing or increased cough     Call MD for:  persistent dizziness, light-headedness, or visual disturbances     Other restrictions (specify):   Order Comments: Pelvic rest x 12 weeks (no tampons, intercourse douching); showers only for 6 wks; no lifting more than 10-15lbs     Follow-up Information     Norma Chen MD In 2 weeks.    Specialty:  Obstetrics and Gynecology  Why:  post op care  Contact information:  [address] [place] [id number] [phone]

## 2019-07-31 NOTE — INTERVAL H&P NOTE
The patient has been examined and the H&P has been reviewed:    I concur with the findings and no changes have occurred since H&P was written.  She is ready to proceed with robotic assisted laparoscopic hysterectomy with bilateral salpingectomy.  She wishes to retain her ovaries.  She adamantly does not desire further fertility.  She is aware that removal of uterus will prevent her from having any other children.  She is aware if the procedure cannot be completed using a minimally invasive approach an open incision will be made.  She has no further questions.  Consents have been signed    Anesthesia/Surgery risks, benefits and alternative options discussed and understood by patient/family.          Active Hospital Problems    Diagnosis  POA    Menometrorrhagia [N92.1]  Yes      Resolved Hospital Problems   No resolved problems to display.

## 2019-07-31 NOTE — HPI
Patient is a 23 y.o. who  presents with 1+ year of pelvic pain and abdominal bloating.  Also reports irregular bleeding after trying different types of ocp and no improvement of pain on ocp.  She adamantly does not desire further fertility.  She feels strongly that the bleeding and pain is related to her uterus.

## 2019-07-31 NOTE — ANESTHESIA POSTPROCEDURE EVALUATION
Anesthesia Post Evaluation    Patient: Madelyn gAuilar    Procedure(s) Performed: Procedure(s) (LRB):  XI ROBOTIC HYSTERECTOMY (N/A)  XI ROBOTIC SALPINGECTOMY (Bilateral)    Final Anesthesia Type: general  Patient location during evaluation: PACU  Patient participation: Yes- Able to Participate  Level of consciousness: awake and alert  Post-procedure vital signs: reviewed and stable  Pain management: adequate  Airway patency: patent  PONV status at discharge: No PONV  Anesthetic complications: no      Cardiovascular status: hemodynamically stable  Respiratory status: spontaneous ventilation  Hydration status: euvolemic  Follow-up not needed.          Vitals Value Taken Time   /81 7/31/2019  9:20 AM   Temp 36.6 °C (97.8 °F) 7/31/2019  8:45 AM   Pulse 80 7/31/2019  9:21 AM   Resp 22 7/31/2019  9:21 AM   SpO2 100 % 7/31/2019  9:21 AM   Vitals shown include unvalidated device data.      No case tracking events are documented in the log.      Pain/Shemar Score: Shemar Score: 7 (7/31/2019  9:00 AM)

## 2019-07-31 NOTE — ANESTHESIA PREPROCEDURE EVALUATION
07/31/2019  Madelyn Aguilar is a 23 y.o., female.    Pre-op Assessment    I have reviewed the Patient Summary Reports.     I have reviewed the Medications.     Review of Systems  Anesthesia Hx:  No problems with previous Anesthesia   Denies Personal Hx of Anesthesia complications.   Hematology/Oncology:  Hematology Normal   Oncology Normal     EENT/Dental:EENT/Dental Normal   Cardiovascular:  Cardiovascular Normal Exercise tolerance: good     Pulmonary:  Pulmonary Normal    Renal/:  Renal/ Normal     Hepatic/GI:   GERD    Musculoskeletal:  Musculoskeletal Normal    Neurological:  Neurology Normal    Endocrine:  Endocrine Normal    Dermatological:  Skin Normal    Psych:  Psychiatric Normal           Physical Exam  General:  Well nourished    Airway/Jaw/Neck:  Airway Findings: Mouth Opening: Normal Tongue: Normal  Mallampati: II      Dental:  Dental Findings: In tact   Chest/Lungs:  Chest/Lungs Clear    Heart/Vascular:  Heart Findings: Normal Heart murmur: negative       Mental Status:  Mental Status Findings:  Cooperative, Alert and Oriented         Anesthesia Plan  Type of Anesthesia, risks & benefits discussed:  Anesthesia Type:  general  Patient's Preference:   Intra-op Monitoring Plan: standard ASA monitors  Intra-op Monitoring Plan Comments:   Post Op Pain Control Plan: multimodal analgesia  Post Op Pain Control Plan Comments:   Induction:   IV  Beta Blocker:  Patient is not currently on a Beta-Blocker (No further documentation required).       Informed Consent: Patient understands risks and agrees with Anesthesia plan.  Questions answered. Anesthesia consent signed with patient.  ASA Score: 1     Day of Surgery Review of History & Physical: I have interviewed and examined the patient. I have reviewed the patient's H&P dated:    H&P update referred to the surgeon.

## 2019-07-31 NOTE — OP NOTE
[facility] -   General Surgery  Operative Note    SUMMARY     Date of Procedure: 7/31/2019     Procedure: Procedure(s) (LRB):  XI ROBOTIC HYSTERECTOMY (N/A)  XI ROBOTIC SALPINGECTOMY (Bilateral)       Surgeon(s) and Role:     * Norma Chen MD - Primary    Assisting Surgeon: Milka Brown    Pre-Operative Diagnosis: Pelvic pain in female [R10.2]    Post-Operative Diagnosis: Post-Op Diagnosis Codes:     * Pelvic pain in female [R10.2]    Anesthesia: General    Technical Procedures Used: robotic assisted laparoscopic hysterectomy with bilateral salpingectomy      The patient was taken to the Operating Room where general        endotracheal anesthesia was induced and found to be adequate.  She was       then placed in the dorsal lithotomy position in the Krunal stirrups and her        arms tucked at her sides.  Her perineum was then prepped and draped in a          normal sterile fashion.  A Lynch catheter was placed in her bladder and hung to     gravity.  A JOSEE intrauterine manipulator with a KOH colpotomizer cup and    a vaginal occluder balloon were then placed with a good fit.  All other        instruments were removed from the vagina, and her legs were placed in a      low lithotomy position.  The patient's abdomen was then prepped and draped in    the normal sterile fashion.  Pre-op antibiotics were given.  Time out was performed.    The periumbilical skin was then tented up with perforating towel clamps,     and the Veress needle  was inserted through the umbilicus    into the intraperitoneal cavity.  Intraperitoneal placement was  confirmed with                     a water drop test, and pneumoperitoneum was achieved with carbon dioxide     gas up to a pressure of 15 mmHg.  The Veress needle was then removed, and an 8mm  skin incision was made superiorly to  the umbilicus.  An 8mm trocar was inserted        through this incision.  The scope was then inserted through this trocar.              Inspection of underlying organs revealed no damage or injury. But revealed findings as noted above.   Then, 8-mm    trocars were placed bilaterally under direct visualization, and a 5-mm       left  upper quadrant trocar was placed under direct visualization.  All            instruments were then removed from the trocars and the patient was placed    in Trendelenburg position.  The patient cart for the da Huong surgical       system was then docked at the bedside.  We then proceeded with  the         Hysterectomy.                                                                                                                                                                                The left round ligament was then cauterized and transected.  This opened     up  the leaf of the broad ligament on the left side, and this incision was       carried anteromedially to create a bladder flap.  The left fallopian tube was then excised working from the fimbriated end to the cornual end along the mesosalpinx. The left uteroovarian ligament was then doubly cauterized and transected.  The left uterine artery was then skeletonized.  We then proceeded to take down the    right round ligament.  The right round ligament was cauterized and transected.  This opened up the broad ligament on the right side and this incision was carried anteromedially.  The bladder flap was then completed using both cautery and blunt dissection.  The right fallopian tube was then excised working from the fimbriated end to the cornual end along the mesosalpinx.  The right uteroovarian ligament was then doubly cauterized and transected.  The right uterine artery was then skeletonized.  The uterus was then sharply retroflexed, and an anterior colpotomy was made with the monopolar EndoShears along the level of the KOH cup.  The uterus was then sharply anteflexed, and a posterior colpotomy was made with the EndoShears along the level of the KOH cup.       The  colpotomy incision was carried around towards the left side and the left     uterine artery was doubly cauterized and transected.      We then moved [id number] along the right aspect of the KOH    cup.  The right uterine artery was then doubly cauterized and transected.    The colpotomy was then completed working [id number].  The     uterus and cervix were then removed and remained in the vagina to help       maintain pneumoperitoneum for the rest of the case.     The cuff was  irrigated  and cleared of all clots and debris.  Hemostasis at the cuff was achieved with cautery.  The vaginal cuff was then reapproximated with 0-Vicryl running suture starting at each angly and tying in the midline (lap tys placed at end of suture); .  Again, the pelvis was irrigated and cleared of all clots and debris.  Excellent hemostasis was noted.      The bilateral ureters were seen in their normal anatomic location and were vermiculating.    At this point, all instruments were removed from the trocars and the patient cart was undocked.  Pneumoperitoneum was then allowed to escape, and all trocars were removed.     Hemostasis at all skin sites was achieved with Bovie cautery.  All skin      sites were closed with 4-0 monocryl in a running subcuticular fashion.  The    patient tolerated the procedure well.  Sponge, lap and needle counts         correct x2.  She will go to Recovery in stable condition.            Description of the Findings of the Procedure: 10 wk size boggy uterus; normal tubes bilaterally; normal ovaries bilaterally    Significant Surgical Tasks Conducted by the Assistant(s), if Applicable: 1st assist    Complications: No    Estimated Blood Loss (EBL): 10 mL           Implants: * No implants in log *    Specimens:   Specimen (12h ago, onward)    Start     Ordered    07/31/19 1024  Specimen to Pathology - Surgery  Once     Comments:  Pre-op Diagnosis: Pelvic pain in female  [R10.2]Procedure(s):XI ROBOTIC HYSTERECTOMYXI ROBOTIC SALPINGECTOMY Name of specimens: 1) Cervix, uterus, bilateral fallopian tubes PERM     Start Status     07/31/19 0740 Collected (07/31/19 0823) Order ID: [id number]       07/31/19 0823                  Condition: Good    Disposition: PACU - hemodynamically stable.    Attestation: I performed the procedure.

## 2019-07-31 NOTE — TRANSFER OF CARE
"Anesthesia Transfer of Care Note    Patient: Madelyn Aguilar    Procedure(s) Performed: Procedure(s) (LRB):  XI ROBOTIC HYSTERECTOMY (N/A)  XI ROBOTIC SALPINGECTOMY (Bilateral)    Patient location: PACU    Anesthesia Type: general    Transport from OR: Transported from OR on room air with adequate spontaneous ventilation    Post pain: adequate analgesia    Post assessment: no apparent anesthetic complications and tolerated procedure well    Post vital signs: stable    Level of consciousness: sedated    Nausea/Vomiting: no nausea/vomiting    Complications: none    Transfer of care protocol was followed      Last vitals:   Visit Vitals  BP (!) 140/77 (Patient Position: Sitting)   Pulse 82   Temp 36.7 °C (98.1 °F) (Skin)   Resp 16   Ht 5' 5" (1.651 m)   Wt 78.8 kg (173 lb 11.6 oz)   LMP 07/11/2019 (Exact Date)   SpO2 100%   Breastfeeding? No   BMI 28.91 kg/m²     "

## 2019-08-02 VITALS
SYSTOLIC BLOOD PRESSURE: 144 MMHG | HEART RATE: 89 BPM | DIASTOLIC BLOOD PRESSURE: 61 MMHG | OXYGEN SATURATION: 99 % | RESPIRATION RATE: 11 BRPM | WEIGHT: 173.75 LBS | TEMPERATURE: 98 F | HEIGHT: 65 IN | BODY MASS INDEX: 28.95 KG/M2

## 2019-08-13 ENCOUNTER — OFFICE VISIT (OUTPATIENT)
Dept: OBSTETRICS AND GYNECOLOGY | Facility: CLINIC | Age: 24
End: 2019-08-13
Payer: MEDICAID

## 2019-08-13 VITALS
WEIGHT: 169 LBS | SYSTOLIC BLOOD PRESSURE: 132 MMHG | DIASTOLIC BLOOD PRESSURE: 88 MMHG | HEIGHT: 65 IN | BODY MASS INDEX: 28.16 KG/M2

## 2019-08-13 DIAGNOSIS — R10.2 PELVIC PAIN: Primary | ICD-10-CM

## 2019-08-13 PROCEDURE — 99999 PR PBB SHADOW E&M-EST. PATIENT-LVL III: ICD-10-PCS | Mod: PBBFAC,,,

## 2019-08-13 PROCEDURE — 99999 PR PBB SHADOW E&M-EST. PATIENT-LVL III: CPT | Mod: PBBFAC,,,

## 2019-08-13 PROCEDURE — 99213 OFFICE O/P EST LOW 20 MIN: CPT | Mod: PBBFAC

## 2019-08-13 PROCEDURE — 99499 UNLISTED E&M SERVICE: CPT | Mod: S$PBB,,, | Performed by: OBSTETRICS & GYNECOLOGY

## 2019-08-13 PROCEDURE — 99499 NO LOS: ICD-10-PCS | Mod: S$PBB,,, | Performed by: OBSTETRICS & GYNECOLOGY

## 2019-08-13 RX ORDER — CLOTRIMAZOLE AND BETAMETHASONE DIPROPIONATE 10; .64 MG/G; MG/G
CREAM TOPICAL
Qty: 15 G | Refills: 2 | Status: SHIPPED | OUTPATIENT
Start: 2019-08-13 | End: 2019-08-28

## 2019-08-26 NOTE — PROGRESS NOTES
Subjective:       Patient ID: Madelyn Aguilar is a 24 y.o. female.    Chief Complaint:  Post-op Evaluation      History of Present Illness  HPI  Patient here today with continued pain and concerns of bleeding s/p RALH 2 weeks ago with Dr. Chen.  Denies heavy bleeding or continued Cramping.      GYN & OB History  Patient's last menstrual period was 2019 (exact date).   Date of Last Pap: 2018    OB History    Para Term  AB Living   3 1 1 0 1 2   SAB TAB Ectopic Multiple Live Births   1 0 0 0 1      # Outcome Date GA Lbr Dusty/2nd Weight Sex Delivery Anes PTL Lv   3             2 SAB            1 Term     F CS-Unspec   JAYCOB      Complications: Failure to Progress in First Stage       Review of Systems  Review of Systems        Objective:    Physical Exam       Assessment:        1. Pelvic pain                Plan:      Discussed the expectations for pain at this point s/p RALH.  Patient was counseled on continuing motrin q8hrs.  Keep postop appt.

## 2019-08-28 ENCOUNTER — OFFICE VISIT (OUTPATIENT)
Dept: OBSTETRICS AND GYNECOLOGY | Facility: CLINIC | Age: 24
End: 2019-08-28
Payer: MEDICAID

## 2019-08-28 VITALS — DIASTOLIC BLOOD PRESSURE: 88 MMHG | SYSTOLIC BLOOD PRESSURE: 134 MMHG | WEIGHT: 170 LBS | BODY MASS INDEX: 28.29 KG/M2

## 2019-08-28 DIAGNOSIS — N92.1 MENOMETRORRHAGIA: ICD-10-CM

## 2019-08-28 DIAGNOSIS — Z90.710 S/P LAPAROSCOPIC HYSTERECTOMY: Primary | ICD-10-CM

## 2019-08-28 PROBLEM — R10.2 PELVIC PAIN: Status: RESOLVED | Noted: 2019-07-31 | Resolved: 2019-08-28

## 2019-08-28 PROCEDURE — 99999 PR PBB SHADOW E&M-EST. PATIENT-LVL II: ICD-10-PCS | Mod: PBBFAC,,, | Performed by: OBSTETRICS & GYNECOLOGY

## 2019-08-28 PROCEDURE — 99212 OFFICE O/P EST SF 10 MIN: CPT | Mod: PBBFAC,PN | Performed by: OBSTETRICS & GYNECOLOGY

## 2019-08-28 PROCEDURE — 99999 PR PBB SHADOW E&M-EST. PATIENT-LVL II: CPT | Mod: PBBFAC,,, | Performed by: OBSTETRICS & GYNECOLOGY

## 2019-08-28 PROCEDURE — 99024 PR POST-OP FOLLOW-UP VISIT: ICD-10-PCS | Mod: ,,, | Performed by: OBSTETRICS & GYNECOLOGY

## 2019-08-28 PROCEDURE — 99024 POSTOP FOLLOW-UP VISIT: CPT | Mod: ,,, | Performed by: OBSTETRICS & GYNECOLOGY

## 2019-08-28 NOTE — PROGRESS NOTES
"Subjective:       Patient ID: Madelyn Aguilar is a 24 y.o. female.    Chief Complaint:  Post-op Evaluation      History of Present Illness  HPI  Postoperative Follow-up  Patient presents to the clinic 4 weeks status post Davinci assisted hysterectomy for abnormal uterine bleeding and pelvic pain. Eating a regular diet without difficulty. Bowel movements are still with problems with constipation. The patient is not having any pain.  Denies problems void  Sleeping well  Reports occas pink vaginal discharge--denies odor;   Denies fever/chills    Feels well--"happy"    GYN & OB History  Patient's last menstrual period was 2019 (exact date).   Date of Last Pap: 2018    OB History    Para Term  AB Living   3 1 1 0 2 2   SAB TAB Ectopic Multiple Live Births   2 0 0 0 1      # Outcome Date GA Lbr Dusty/2nd Weight Sex Delivery Anes PTL Lv   3 Term 13    F CS-Unspec   JAYCOB      Complications: Failure to Progress in First Stage   2 SAB            1 SAB                Review of Systems  Review of Systems   All other systems reviewed and are negative.          Objective:      Physical Exam:   Constitutional: She appears well-developed.     Eyes: Pupils are equal, round, and reactive to light. Conjunctivae and EOM are normal.    Neck: Normal range of motion. Neck supple.     Pulmonary/Chest: Effort normal. Right breast exhibits no mass, no nipple discharge, no skin change and no tenderness. Left breast exhibits no mass, no nipple discharge, no skin change and no tenderness. Breasts are symmetrical.        Abdominal: Soft.     Genitourinary: Rectum normal. Pelvic exam was performed with patient supine. Uterus is absent. Right adnexum displays no mass and no tenderness. Left adnexum displays no mass and no tenderness. No erythema, bleeding, rectocele, cystocele or unspecified prolapse of vaginal walls in the vagina. Vaginal discharge (pink ) found. Vaginal cuff normal.Labial bartholins " normal.Cervix exhibits absence. Also,  no recent pap smear            Musculoskeletal: Normal range of motion.       Neurological: She is alert.    Skin: Skin is warm.    Psychiatric: She has a normal mood and affect.           Assessment:     Encounter Diagnoses   Name Primary?    S/P laparoscopic assisted robotic hysterectomy with bilateral salpingectomy Yes    Menometrorrhagia                  Plan:   Path report reviewed--cervix wnl; ut /tubes wnl  Continue pelvic rest/lifting restriction for 8 wks  Showers for 2 more weeks  Released to work

## 2019-09-18 ENCOUNTER — PATIENT MESSAGE (OUTPATIENT)
Dept: OBSTETRICS AND GYNECOLOGY | Facility: CLINIC | Age: 24
End: 2019-09-18

## 2019-09-24 ENCOUNTER — PATIENT MESSAGE (OUTPATIENT)
Dept: OBSTETRICS AND GYNECOLOGY | Facility: CLINIC | Age: 24
End: 2019-09-24

## 2019-09-24 DIAGNOSIS — B37.31 YEAST VAGINITIS: Primary | ICD-10-CM

## 2019-09-24 RX ORDER — FLUCONAZOLE 200 MG/1
200 TABLET ORAL DAILY
Qty: 3 TABLET | Refills: 0 | Status: SHIPPED | OUTPATIENT
Start: 2019-09-24 | End: 2019-09-27

## 2019-11-05 DIAGNOSIS — B37.31 YEAST VAGINITIS: ICD-10-CM

## 2019-11-05 RX ORDER — FLUCONAZOLE 200 MG/1
200 TABLET ORAL DAILY
Qty: 3 TABLET | Refills: 0 | OUTPATIENT
Start: 2019-11-05 | End: 2019-11-08

## 2019-11-18 ENCOUNTER — TELEPHONE (OUTPATIENT)
Dept: OBSTETRICS AND GYNECOLOGY | Facility: CLINIC | Age: 24
End: 2019-11-18

## 2019-11-18 NOTE — TELEPHONE ENCOUNTER
----- Message from Jenelle Deng sent at 11/18/2019  2:51 PM CST -----  Contact: self  Type:  Appointment Request    Name of Caller:patient states experiencing vaginal discharge need appointment for tomorrow   When is the first available appointment?  Symptoms:  Would the patient rather a call back or a response via [facility]? call  Best Call Back Number:[phone]  Additional Information:

## 2019-11-19 ENCOUNTER — LAB VISIT (OUTPATIENT)
Dept: LAB | Facility: HOSPITAL | Age: 24
End: 2019-11-19
Attending: NURSE PRACTITIONER
Payer: MEDICAID

## 2019-11-19 ENCOUNTER — OFFICE VISIT (OUTPATIENT)
Dept: OBSTETRICS AND GYNECOLOGY | Facility: CLINIC | Age: 24
End: 2019-11-19
Payer: MEDICAID

## 2019-11-19 VITALS
WEIGHT: 172.63 LBS | SYSTOLIC BLOOD PRESSURE: 142 MMHG | HEIGHT: 65 IN | BODY MASS INDEX: 28.76 KG/M2 | DIASTOLIC BLOOD PRESSURE: 84 MMHG

## 2019-11-19 DIAGNOSIS — Z71.1 CONCERN ABOUT STD IN FEMALE WITHOUT DIAGNOSIS: Primary | ICD-10-CM

## 2019-11-19 DIAGNOSIS — Z71.1 CONCERN ABOUT STD IN FEMALE WITHOUT DIAGNOSIS: ICD-10-CM

## 2019-11-19 PROCEDURE — 99213 OFFICE O/P EST LOW 20 MIN: CPT | Mod: S$PBB,,, | Performed by: NURSE PRACTITIONER

## 2019-11-19 PROCEDURE — 86703 HIV-1/HIV-2 1 RESULT ANTBDY: CPT

## 2019-11-19 PROCEDURE — 99999 PR PBB SHADOW E&M-EST. PATIENT-LVL III: ICD-10-PCS | Mod: PBBFAC,,, | Performed by: NURSE PRACTITIONER

## 2019-11-19 PROCEDURE — 87661 TRICHOMONAS VAGINALIS AMPLIF: CPT

## 2019-11-19 PROCEDURE — 87481 CANDIDA DNA AMP PROBE: CPT | Mod: 59

## 2019-11-19 PROCEDURE — 80074 ACUTE HEPATITIS PANEL: CPT

## 2019-11-19 PROCEDURE — 86696 HERPES SIMPLEX TYPE 2 TEST: CPT

## 2019-11-19 PROCEDURE — 86780 TREPONEMA PALLIDUM: CPT

## 2019-11-19 PROCEDURE — 86593 SYPHILIS TEST NON-TREP QUANT: CPT

## 2019-11-19 PROCEDURE — 87491 CHLMYD TRACH DNA AMP PROBE: CPT | Mod: 59

## 2019-11-19 PROCEDURE — 99999 PR PBB SHADOW E&M-EST. PATIENT-LVL III: CPT | Mod: PBBFAC,,, | Performed by: NURSE PRACTITIONER

## 2019-11-19 PROCEDURE — 87801 DETECT AGNT MULT DNA AMPLI: CPT

## 2019-11-19 PROCEDURE — 86592 SYPHILIS TEST NON-TREP QUAL: CPT

## 2019-11-19 PROCEDURE — 99213 PR OFFICE/OUTPT VISIT, EST, LEVL III, 20-29 MIN: ICD-10-PCS | Mod: S$PBB,,, | Performed by: NURSE PRACTITIONER

## 2019-11-19 PROCEDURE — 36415 COLL VENOUS BLD VENIPUNCTURE: CPT

## 2019-11-19 PROCEDURE — 99213 OFFICE O/P EST LOW 20 MIN: CPT | Mod: PBBFAC | Performed by: NURSE PRACTITIONER

## 2019-11-20 LAB
BACTERIAL VAGINOSIS DNA: POSITIVE
C TRACH DNA SPEC QL NAA+PROBE: NOT DETECTED
CANDIDA GLABRATA DNA: NEGATIVE
CANDIDA KRUSEI DNA: NEGATIVE
CANDIDA RRNA VAG QL PROBE: NEGATIVE
HAV IGM SERPL QL IA: NEGATIVE
HBV CORE IGM SERPL QL IA: NEGATIVE
HBV SURFACE AG SERPL QL IA: NEGATIVE
HCV AB SERPL QL IA: NEGATIVE
HIV 1+2 AB+HIV1 P24 AG SERPL QL IA: NEGATIVE
N GONORRHOEA DNA SPEC QL NAA+PROBE: NOT DETECTED
RPR SER QL: REACTIVE
RPR SER-TITR: ABNORMAL {TITER}
T VAGINALIS RRNA GENITAL QL PROBE: NEGATIVE

## 2019-11-20 RX ORDER — METRONIDAZOLE 500 MG/1
500 TABLET ORAL 2 TIMES DAILY
Qty: 14 TABLET | Refills: 0 | Status: SHIPPED | OUTPATIENT
Start: 2019-11-20 | End: 2019-11-27

## 2019-11-22 LAB
HSV1 IGG SERPL QL IA: NEGATIVE
HSV2 IGG SERPL QL IA: POSITIVE

## 2019-11-25 LAB — T PALLIDUM AB SER QL IF: REACTIVE

## 2020-01-06 ENCOUNTER — OFFICE VISIT (OUTPATIENT)
Dept: OBSTETRICS AND GYNECOLOGY | Facility: CLINIC | Age: 25
End: 2020-01-06
Payer: MEDICAID

## 2020-01-06 VITALS
SYSTOLIC BLOOD PRESSURE: 102 MMHG | DIASTOLIC BLOOD PRESSURE: 72 MMHG | BODY MASS INDEX: 28.87 KG/M2 | WEIGHT: 173.31 LBS | HEIGHT: 65 IN

## 2020-01-06 DIAGNOSIS — N39.0 RECURRENT UTI: ICD-10-CM

## 2020-01-06 DIAGNOSIS — N30.00 ACUTE CYSTITIS WITHOUT HEMATURIA: Primary | ICD-10-CM

## 2020-01-06 PROBLEM — N92.1 MENOMETRORRHAGIA: Status: RESOLVED | Noted: 2019-07-31 | Resolved: 2020-01-06

## 2020-01-06 LAB
BILIRUB SERPL-MCNC: NORMAL MG/DL
BLOOD URINE, POC: NEGATIVE
COLOR, POC UA: NORMAL
GLUCOSE UR QL STRIP: NORMAL
KETONES UR QL STRIP: NORMAL
LEUKOCYTE ESTERASE URINE, POC: NORMAL
NITRITE, POC UA: NEGATIVE
PH, POC UA: NORMAL
PROTEIN, POC: NORMAL
SPECIFIC GRAVITY, POC UA: NORMAL
UROBILINOGEN, POC UA: NORMAL

## 2020-01-06 PROCEDURE — 99213 OFFICE O/P EST LOW 20 MIN: CPT | Mod: PBBFAC | Performed by: NURSE PRACTITIONER

## 2020-01-06 PROCEDURE — 99213 OFFICE O/P EST LOW 20 MIN: CPT | Mod: S$PBB,,, | Performed by: NURSE PRACTITIONER

## 2020-01-06 PROCEDURE — 81002 URINALYSIS NONAUTO W/O SCOPE: CPT | Mod: PBBFAC | Performed by: NURSE PRACTITIONER

## 2020-01-06 PROCEDURE — 99999 PR PBB SHADOW E&M-EST. PATIENT-LVL III: CPT | Mod: PBBFAC,,, | Performed by: NURSE PRACTITIONER

## 2020-01-06 PROCEDURE — 99999 PR PBB SHADOW E&M-EST. PATIENT-LVL III: ICD-10-PCS | Mod: PBBFAC,,, | Performed by: NURSE PRACTITIONER

## 2020-01-06 PROCEDURE — 87086 URINE CULTURE/COLONY COUNT: CPT

## 2020-01-06 PROCEDURE — 99213 PR OFFICE/OUTPT VISIT, EST, LEVL III, 20-29 MIN: ICD-10-PCS | Mod: S$PBB,,, | Performed by: NURSE PRACTITIONER

## 2020-01-06 RX ORDER — FLUCONAZOLE 150 MG/1
150 TABLET ORAL DAILY
COMMUNITY
Start: 2019-12-10 | End: 2020-04-29

## 2020-01-06 NOTE — PROGRESS NOTES
"CC: Recurrent UTIs    Madelyn Aguilar is a 24 y.o. female  presents for c/o recurrent UTIs. Patient reports " that she has had recurrent UTIs per [facility] urgent care, after she had her hysterectomy". No pelvic pain. Patient reports that dysuria is worse after intercourse. Patient has a h/o renal stone disease. Urine in clinic indicated Leucocytes. No flank pain, hematuria or fever.     Past Medical History:   Diagnosis Date    Abnormal Pap smear of cervix     Anemia     Arthritis     pau arms/legs    GERD (gastroesophageal reflux disease)     Herpes simplex virus (HSV) infection     Pelvic pain 2019    Syphilis, congenital      Past Surgical History:   Procedure Laterality Date     SECTION  2013    HYSTERECTOMY      LOOP ELECTROSURGICAL EXCISION PROCEDURE (LEEP)  2019    hsil on colpo    ROBOT-ASSISTED LAPAROSCOPIC ABDOMINAL HYSTERECTOMY USING DA SULEMA XI N/A 2019    Procedure: XI ROBOTIC HYSTERECTOMY;  Surgeon: Norma Chen MD;  Location: [facility] OR;  Service: OB/GYN;  Laterality: N/A;    ROBOT-ASSISTED SURGICAL REMOVAL OF FALLOPIAN TUBE USING DA SULEMA XI Bilateral 2019    Procedure: XI ROBOTIC SALPINGECTOMY;  Surgeon: Norma Chen MD;  Location: [facility] OR;  Service: OB/GYN;  Laterality: Bilateral;     Family History   Problem Relation Age of Onset    Hypertension Mother     Stroke Mother     Heart attack Mother     Cancer Mother         bile duct cancer    Heart murmur Father      Social History     Tobacco Use    Smoking status: Never Smoker    Smokeless tobacco: Never Used   Substance Use Topics    Alcohol use: No    Drug use: No     OB History        3    Para   1    Term   1       0    AB   2    Living   1       SAB   2    TAB   0    Ectopic   0    Multiple   0    Live Births   1                 /72 (BP Location: Right arm, Patient Position: Sitting, BP Method: Large (Manual))   Ht 5' 5" (1.651 m)   Wt 78.6 kg (173 lb 4.5 " oz)   LMP 07/11/2019 (Exact Date)   BMI 28.84 kg/m²     ROS:  URINARY: HPI  REPRODUCTIVE: See HPI.       1. Acute cystitis without hematuria  POCT URINE DIPSTICK WITHOUT MICROSCOPE    Urine culture    Patient will be referred for additional evaluation, Urology  Urine sent for cx

## 2020-01-06 NOTE — LETTER
January 6, 2020      [facility]  [address] [facility] [place] [address]  Phone: [phone]  Fax: [phone]       Patient: Madelyn Aguilar   YOB: 1995  Date of Visit: 01/06/2020    To Whom It May Concern:    Marcelle Aguilar  was at [facility] on 01/06/2020. She may return to work on 01/06/20 with no restrictions. If you have any questions or concerns, or if I can be of further assistance, please do not hesitate to contact me.    Sincerely,    Nakul Arriaga MA

## 2020-01-08 LAB
BACTERIA UR CULT: NORMAL
BACTERIA UR CULT: NORMAL

## 2020-01-28 ENCOUNTER — TELEPHONE (OUTPATIENT)
Dept: UROLOGY | Facility: CLINIC | Age: 25
End: 2020-01-28

## 2020-03-17 ENCOUNTER — OFFICE VISIT (OUTPATIENT)
Dept: OBSTETRICS AND GYNECOLOGY | Facility: CLINIC | Age: 25
End: 2020-03-17
Payer: MEDICAID

## 2020-03-17 VITALS
SYSTOLIC BLOOD PRESSURE: 130 MMHG | DIASTOLIC BLOOD PRESSURE: 86 MMHG | BODY MASS INDEX: 29.9 KG/M2 | WEIGHT: 179.44 LBS | HEIGHT: 65 IN

## 2020-03-17 DIAGNOSIS — A50.9 SYPHILIS, CONGENITAL: Primary | ICD-10-CM

## 2020-03-17 PROCEDURE — 99213 OFFICE O/P EST LOW 20 MIN: CPT | Mod: PBBFAC | Performed by: NURSE PRACTITIONER

## 2020-03-17 PROCEDURE — 87184 SC STD DISK METHOD PER PLATE: CPT

## 2020-03-17 PROCEDURE — 87077 CULTURE AEROBIC IDENTIFY: CPT

## 2020-03-17 PROCEDURE — 87186 SC STD MICRODIL/AGAR DIL: CPT

## 2020-03-17 PROCEDURE — 99214 OFFICE O/P EST MOD 30 MIN: CPT | Mod: S$PBB,,, | Performed by: NURSE PRACTITIONER

## 2020-03-17 PROCEDURE — 87086 URINE CULTURE/COLONY COUNT: CPT

## 2020-03-17 PROCEDURE — 99214 PR OFFICE/OUTPT VISIT, EST, LEVL IV, 30-39 MIN: ICD-10-PCS | Mod: S$PBB,,, | Performed by: NURSE PRACTITIONER

## 2020-03-17 PROCEDURE — 99999 PR PBB SHADOW E&M-EST. PATIENT-LVL III: ICD-10-PCS | Mod: PBBFAC,,, | Performed by: NURSE PRACTITIONER

## 2020-03-17 PROCEDURE — 99999 PR PBB SHADOW E&M-EST. PATIENT-LVL III: CPT | Mod: PBBFAC,,, | Performed by: NURSE PRACTITIONER

## 2020-03-17 PROCEDURE — 87088 URINE BACTERIA CULTURE: CPT

## 2020-03-17 RX ADMIN — PENICILLIN G BENZATHINE 1.2 MILLION UNITS: 1200000 INJECTION, SUSPENSION INTRAMUSCULAR at 09:03

## 2020-03-17 NOTE — LETTER
March 17, 2020      [facility]  [address] [address]  Phone: [phone]  Fax: [phone]       Patient: Madelyn Aguilar   YOB: 1995  Date of Visit: 03/17/2020    To Whom It May Concern:    Marcelle Aguilar  was at [facility] on 03/17/2020. She may return to work/school on 3/17/20 with no restrictions. If you have any questions or concerns, or if I can be of further assistance, please do not hesitate to contact me.    Sincerely,    Arabella Wagoner LPN

## 2020-03-17 NOTE — PROGRESS NOTES
Verified pt by two identifiers: name and date of birth. Allergies and medications reviewed.     Penicillin 1.2 million units Given IM to left ventrogluteal using aseptic technique. Penicillin 1.2 million units Given IM to right ventrogluteal using aseptic technique. No discomfort noted, pt tolerated well. Advised to wait 15 minutes in clinic to monitor. Patient verbalized understanding.

## 2020-03-17 NOTE — PROGRESS NOTES
CC: RPR positive    Madelyn Aguilar is a 24 y.o. female  presents for follow-up to RPR quantitative 1:8. Patient has congential Syphilis . Patient had repeat RP quantitative with a ration of 1:8. Patient reports dysuria for 3 days. No hematuria, flank pain or fever. Urine in clinic indicated Nitrates. No pelvic or flank pain.     Past Medical History:   Diagnosis Date    Abnormal Pap smear of cervix     Anemia     Arthritis     pau arms/legs    GERD (gastroesophageal reflux disease)     Herpes simplex virus (HSV) infection     Pelvic pain 2019    Syphilis, congenital      Past Surgical History:   Procedure Laterality Date     SECTION  2013    HYSTERECTOMY      LOOP ELECTROSURGICAL EXCISION PROCEDURE (LEEP)  2019    hsil on colpo    ROBOT-ASSISTED LAPAROSCOPIC ABDOMINAL HYSTERECTOMY USING DA SULEMA XI N/A 2019    Procedure: XI ROBOTIC HYSTERECTOMY;  Surgeon: Norma Chen MD;  Location: [facility] OR;  Service: OB/GYN;  Laterality: N/A;    ROBOT-ASSISTED SURGICAL REMOVAL OF FALLOPIAN TUBE USING DA SULEMA XI Bilateral 2019    Procedure: XI ROBOTIC SALPINGECTOMY;  Surgeon: Norma Chen MD;  Location: [facility] OR;  Service: OB/GYN;  Laterality: Bilateral;     Social History     Socioeconomic History    Marital status: Single     Spouse name: Not on file    Number of children: Not on file    Years of education: Not on file    Highest education level: Not on file   Occupational History    Not on file   Social Needs    Financial resource strain: Not on file    Food insecurity:     Worry: Not on file     Inability: Not on file    Transportation needs:     Medical: Not on file     Non-medical: Not on file   Tobacco Use    Smoking status: Never Smoker    Smokeless tobacco: Never Used   Substance and Sexual Activity    Alcohol use: No    Drug use: No    Sexual activity: Yes     Partners: Male     Birth control/protection: Surgical     Comment: hyst   Lifestyle  "   Physical activity:     Days per week: Not on file     Minutes per session: Not on file    Stress: Not on file   Relationships    Social connections:     Talks on phone: Not on file     Gets together: Not on file     Attends [employer] service: Not on file     Active member of club or organization: Not on file     Attends meetings of clubs or organizations: Not on file     Relationship status: Not on file   Other Topics Concern    Not on file   Social History Narrative    Not on file     Family History   Problem Relation Age of Onset    Hypertension Mother     Stroke Mother     Heart attack Mother     Cancer Mother         bile duct cancer    Heart murmur Father      OB History        3    Para   1    Term   1       0    AB   2    Living   1       SAB   2    TAB   0    Ectopic   0    Multiple   0    Live Births   1                 Ht 5' 5" (1.651 m)   LMP 2019 (Exact Date)   BMI 28.84 kg/m²       ROS:  ABDOMEN: No abdominal pain, constipation, diarrhea, nausea, vomiting or rectal bleeding.   URINARY: No frequency, dysuria, hematuria, or burning on urination.  REPRODUCTIVE: See HPI.   BREASTS: The patient performs breast self-examination and denies pain, lumps, or nipple discharge.   HEMATOLOGIC: No easy bruisability or excessive bleeding.   MUSCULOSKELETAL: Denies joint pain or swelling.   NEUROLOGIC: Denies syncope or weakness.   PSYCHIATRIC: Denies depression, anxiety or mood swings.    PHYSICAL EXAM:  ABDOMEN: Soft.  No tenderness or masses.  No hepatosplenomegaly.  No hernias.No CVA tenderness.    1. Syphilis, congenital       PLAN:  Urine sent for cx  Bicillin 2.4 million units today and every 7 days for a total of 3 doses.           "

## 2020-03-20 DIAGNOSIS — N30.00 ACUTE CYSTITIS WITHOUT HEMATURIA: Primary | ICD-10-CM

## 2020-03-20 LAB — BACTERIA UR CULT: ABNORMAL

## 2020-03-26 ENCOUNTER — TELEPHONE (OUTPATIENT)
Dept: OBSTETRICS AND GYNECOLOGY | Facility: CLINIC | Age: 25
End: 2020-03-26

## 2020-03-26 NOTE — TELEPHONE ENCOUNTER
----- Message from Renetta Leonard sent at 3/26/2020  8:58 AM CDT -----  Contact: Patient  .Type:  Patient Returning Call    Who Called:Patient  Who Left Message for Patient:   KOKO   Does the patient know what this is regarding?: earlier appointment time, according to patient  Would the patient rather a call back or a response via [facility]? Call   Best Call Back Number:.[phone] (home)   Additional Information: Patient states to leave a time in the portal if no answer, anytime is good for her tomorrow she is off from work

## 2020-03-27 ENCOUNTER — CLINICAL SUPPORT (OUTPATIENT)
Dept: OBSTETRICS AND GYNECOLOGY | Facility: CLINIC | Age: 25
End: 2020-03-27
Payer: MEDICAID

## 2020-03-27 PROCEDURE — 99211 OFF/OP EST MAY X REQ PHY/QHP: CPT | Mod: PBBFAC,25

## 2020-03-27 PROCEDURE — 99999 PR PBB SHADOW E&M-EST. PATIENT-LVL I: CPT | Mod: PBBFAC,,,

## 2020-03-27 PROCEDURE — 96372 THER/PROPH/DIAG INJ SC/IM: CPT | Mod: PBBFAC

## 2020-03-27 PROCEDURE — 99999 PR PBB SHADOW E&M-EST. PATIENT-LVL I: ICD-10-PCS | Mod: PBBFAC,,,

## 2020-03-27 RX ADMIN — PENICILLIN G BENZATHINE 1.2 MILLION UNITS: 1200000 INJECTION, SUSPENSION INTRAMUSCULAR at 08:03

## 2020-03-31 ENCOUNTER — PATIENT MESSAGE (OUTPATIENT)
Dept: OBSTETRICS AND GYNECOLOGY | Facility: CLINIC | Age: 25
End: 2020-03-31

## 2020-04-06 ENCOUNTER — CLINICAL SUPPORT (OUTPATIENT)
Dept: OBSTETRICS AND GYNECOLOGY | Facility: CLINIC | Age: 25
End: 2020-04-06
Payer: MEDICAID

## 2020-04-06 PROCEDURE — 99999 PR PBB SHADOW E&M-EST. PATIENT-LVL II: CPT | Mod: PBBFAC,,,

## 2020-04-06 PROCEDURE — 96372 THER/PROPH/DIAG INJ SC/IM: CPT | Mod: PBBFAC

## 2020-04-06 PROCEDURE — 99212 OFFICE O/P EST SF 10 MIN: CPT | Mod: PBBFAC

## 2020-04-06 PROCEDURE — 99999 PR PBB SHADOW E&M-EST. PATIENT-LVL II: ICD-10-PCS | Mod: PBBFAC,,,

## 2020-04-06 RX ADMIN — PENICILLIN G BENZATHINE 1.2 MILLION UNITS: 1200000 INJECTION, SUSPENSION INTRAMUSCULAR at 09:04

## 2020-04-06 NOTE — PROGRESS NOTES
Verified pt by two identifiers: name and date of birth. Allergies and medications reviewed.      Penicillin 1.2 million units Given IM to left ventrogluteal using aseptic technique. Penicillin 1.2 million units Given IM to right ventrogluteal using aseptic technique. No discomfort noted, pt tolerated well. Advised to wait 15 minutes in clinic to monitor. Patient verbalized understanding

## 2020-04-28 ENCOUNTER — PATIENT MESSAGE (OUTPATIENT)
Dept: OBSTETRICS AND GYNECOLOGY | Facility: CLINIC | Age: 25
End: 2020-04-28

## 2020-04-29 RX ORDER — FLUCONAZOLE 150 MG/1
150 TABLET ORAL DAILY
Qty: 2 TABLET | Refills: 0 | Status: SHIPPED | OUTPATIENT
Start: 2020-04-29 | End: 2020-05-01

## 2020-05-28 ENCOUNTER — OFFICE VISIT (OUTPATIENT)
Dept: OBSTETRICS AND GYNECOLOGY | Facility: CLINIC | Age: 25
End: 2020-05-28
Payer: MEDICAID

## 2020-05-28 VITALS — WEIGHT: 186.31 LBS | BODY MASS INDEX: 31 KG/M2 | SYSTOLIC BLOOD PRESSURE: 136 MMHG | DIASTOLIC BLOOD PRESSURE: 68 MMHG

## 2020-05-28 DIAGNOSIS — N76.0 BV (BACTERIAL VAGINOSIS): Primary | ICD-10-CM

## 2020-05-28 DIAGNOSIS — B96.89 BV (BACTERIAL VAGINOSIS): Primary | ICD-10-CM

## 2020-05-28 PROCEDURE — 99999 PR PBB SHADOW E&M-EST. PATIENT-LVL III: ICD-10-PCS | Mod: PBBFAC,,, | Performed by: NURSE PRACTITIONER

## 2020-05-28 PROCEDURE — 99999 PR PBB SHADOW E&M-EST. PATIENT-LVL III: CPT | Mod: PBBFAC,,, | Performed by: NURSE PRACTITIONER

## 2020-05-28 PROCEDURE — 99213 OFFICE O/P EST LOW 20 MIN: CPT | Mod: PBBFAC | Performed by: NURSE PRACTITIONER

## 2020-05-28 PROCEDURE — 87801 DETECT AGNT MULT DNA AMPLI: CPT

## 2020-05-28 PROCEDURE — 87481 CANDIDA DNA AMP PROBE: CPT | Mod: 59

## 2020-05-28 PROCEDURE — 99213 PR OFFICE/OUTPT VISIT, EST, LEVL III, 20-29 MIN: ICD-10-PCS | Mod: S$PBB,,, | Performed by: NURSE PRACTITIONER

## 2020-05-28 PROCEDURE — 99213 OFFICE O/P EST LOW 20 MIN: CPT | Mod: S$PBB,,, | Performed by: NURSE PRACTITIONER

## 2020-05-28 PROCEDURE — 87491 CHLMYD TRACH DNA AMP PROBE: CPT

## 2020-05-28 PROCEDURE — 87661 TRICHOMONAS VAGINALIS AMPLIF: CPT

## 2020-05-28 RX ORDER — FLUCONAZOLE 150 MG/1
150 TABLET ORAL DAILY
Qty: 2 TABLET | Refills: 0 | Status: SHIPPED | OUTPATIENT
Start: 2020-05-28 | End: 2020-05-30

## 2020-05-28 RX ORDER — METRONIDAZOLE 500 MG/1
500 TABLET ORAL 2 TIMES DAILY
Qty: 14 TABLET | Refills: 0 | Status: SHIPPED | OUTPATIENT
Start: 2020-05-28 | End: 2020-06-04

## 2020-05-28 NOTE — PATIENT INSTRUCTIONS

## 2020-05-28 NOTE — PROGRESS NOTES
CC: Possible BV  Madelyn Aguilar is a 24 y.o. female  presents for possible BV. No dysuria or pelvic pain. Patient is s/p benign hysterectomy.     Past Medical History:   Diagnosis Date    Abnormal Pap smear of cervix     Anemia     Arthritis     pau arms/legs    GERD (gastroesophageal reflux disease)     Herpes simplex virus (HSV) infection     Pelvic pain 2019    Syphilis, congenital      Past Surgical History:   Procedure Laterality Date     SECTION  2013    HYSTERECTOMY      LOOP ELECTROSURGICAL EXCISION PROCEDURE (LEEP)  2019    hsil on colpo    ROBOT-ASSISTED LAPAROSCOPIC ABDOMINAL HYSTERECTOMY USING DA SULEMA XI N/A 2019    Procedure: XI ROBOTIC HYSTERECTOMY;  Surgeon: Norma Chen MD;  Location: [facility] OR;  Service: OB/GYN;  Laterality: N/A;    ROBOT-ASSISTED SURGICAL REMOVAL OF FALLOPIAN TUBE USING DA SULEMA XI Bilateral 2019    Procedure: XI ROBOTIC SALPINGECTOMY;  Surgeon: Norma Chen MD;  Location: [facility] OR;  Service: OB/GYN;  Laterality: Bilateral;     Family History   Problem Relation Age of Onset    Hypertension Mother     Stroke Mother     Heart attack Mother     Cancer Mother         bile duct cancer    Heart murmur Father      Social History     Tobacco Use    Smoking status: Never Smoker    Smokeless tobacco: Never Used   Substance Use Topics    Alcohol use: No    Drug use: No     OB History        3    Para   1    Term   1       0    AB   2    Living   1       SAB   2    TAB   0    Ectopic   0    Multiple   0    Live Births   1                 /68   Wt 84.5 kg (186 lb 4.6 oz)   LMP 2019 (Exact Date)   BMI 31.00 kg/m²     ROS:  GENERAL: Denies weight gain or weight loss. Feeling well overall.   ABDOMEN: No abdominal pain, constipation, diarrhea, nausea, vomiting or rectal bleeding.   URINARY: No frequency, dysuria, hematuria, or burning on urination.  REPRODUCTIVE: See HPI.     PE:   APPEARANCE:  Well nourished, well developed, in no acute distress.  AFFECT: WNL, alert and oriented x 3.  PELVIC: Normal external female genitalia without lesions. Vagina thick, white discharge.    1. BV (bacterial vaginosis)  C. trachomatis/N. gonorrhoeae by AMP DNA    Vaginosis Screen by DNA Probe     PLAN:  GC and Affirm cx  Flagyl and Diflucan rx   Wet prep: few clue cells and scant yeast

## 2020-05-29 LAB
BACTERIAL VAGINOSIS DNA: POSITIVE
C TRACH DNA SPEC QL NAA+PROBE: NOT DETECTED
CANDIDA GLABRATA DNA: NEGATIVE
CANDIDA KRUSEI DNA: NEGATIVE
CANDIDA RRNA VAG QL PROBE: NEGATIVE
N GONORRHOEA DNA SPEC QL NAA+PROBE: NOT DETECTED
T VAGINALIS RRNA GENITAL QL PROBE: NEGATIVE

## 2020-09-18 ENCOUNTER — OFFICE VISIT (OUTPATIENT)
Dept: OBSTETRICS AND GYNECOLOGY | Facility: CLINIC | Age: 25
End: 2020-09-18
Payer: MEDICAID

## 2020-09-18 VITALS
WEIGHT: 192.88 LBS | HEIGHT: 65 IN | SYSTOLIC BLOOD PRESSURE: 140 MMHG | BODY MASS INDEX: 32.14 KG/M2 | DIASTOLIC BLOOD PRESSURE: 84 MMHG

## 2020-09-18 DIAGNOSIS — B96.89 BV (BACTERIAL VAGINOSIS): Primary | ICD-10-CM

## 2020-09-18 DIAGNOSIS — R30.0 DYSURIA: ICD-10-CM

## 2020-09-18 DIAGNOSIS — N76.0 BV (BACTERIAL VAGINOSIS): Primary | ICD-10-CM

## 2020-09-18 PROCEDURE — 99213 OFFICE O/P EST LOW 20 MIN: CPT | Mod: S$PBB,,, | Performed by: NURSE PRACTITIONER

## 2020-09-18 PROCEDURE — 99999 PR PBB SHADOW E&M-EST. PATIENT-LVL III: ICD-10-PCS | Mod: PBBFAC,,, | Performed by: NURSE PRACTITIONER

## 2020-09-18 PROCEDURE — 87088 URINE BACTERIA CULTURE: CPT

## 2020-09-18 PROCEDURE — 99213 OFFICE O/P EST LOW 20 MIN: CPT | Mod: PBBFAC | Performed by: NURSE PRACTITIONER

## 2020-09-18 PROCEDURE — 87077 CULTURE AEROBIC IDENTIFY: CPT

## 2020-09-18 PROCEDURE — 87186 SC STD MICRODIL/AGAR DIL: CPT

## 2020-09-18 PROCEDURE — 99999 PR PBB SHADOW E&M-EST. PATIENT-LVL III: CPT | Mod: PBBFAC,,, | Performed by: NURSE PRACTITIONER

## 2020-09-18 PROCEDURE — 87086 URINE CULTURE/COLONY COUNT: CPT

## 2020-09-18 PROCEDURE — 99213 PR OFFICE/OUTPT VISIT, EST, LEVL III, 20-29 MIN: ICD-10-PCS | Mod: S$PBB,,, | Performed by: NURSE PRACTITIONER

## 2020-09-18 RX ORDER — FLUCONAZOLE 150 MG/1
150 TABLET ORAL DAILY
Qty: 2 TABLET | Refills: 0 | Status: SHIPPED | OUTPATIENT
Start: 2020-09-18 | End: 2020-09-21

## 2020-09-18 RX ORDER — CLINDAMYCIN HYDROCHLORIDE 300 MG/1
300 CAPSULE ORAL EVERY 8 HOURS
Qty: 21 CAPSULE | Refills: 0 | Status: SHIPPED | OUTPATIENT
Start: 2020-09-18 | End: 2020-09-21

## 2020-09-18 NOTE — PROGRESS NOTES
CC: Pain with voiding and vaginal discharge    Madelyn Aguilar is a 25 y.o. female  presents for mild dysuria and vaginal discharge  LMP: Patient's last menstrual period was 2019 (exact date)..  No fever, hematuria, flank pain or pelvic pain. Is sexually active. Urine in clinic indicated trace leucocytes. S/P hysterectomy.     Past Medical History:   Diagnosis Date    Abnormal Pap smear of cervix     Anemia     Arthritis     pau arms/legs    GERD (gastroesophageal reflux disease)     Herpes simplex virus (HSV) infection     Pelvic pain 2019    Syphilis, congenital      Past Surgical History:   Procedure Laterality Date     SECTION  2013    HYSTERECTOMY      LOOP ELECTROSURGICAL EXCISION PROCEDURE (LEEP)  2019    hsil on colpo    ROBOT-ASSISTED LAPAROSCOPIC ABDOMINAL HYSTERECTOMY USING DA SULEMA XI N/A 2019    Procedure: XI ROBOTIC HYSTERECTOMY;  Surgeon: Norma Chen MD;  Location: [facility] OR;  Service: OB/GYN;  Laterality: N/A;    ROBOT-ASSISTED SURGICAL REMOVAL OF FALLOPIAN TUBE USING DA SULEMA XI Bilateral 2019    Procedure: XI ROBOTIC SALPINGECTOMY;  Surgeon: Norma Chen MD;  Location: [facility] OR;  Service: OB/GYN;  Laterality: Bilateral;     Social History     Socioeconomic History    Marital status: Single     Spouse name: Not on file    Number of children: Not on file    Years of education: Not on file    Highest education level: Not on file   Occupational History    Not on file   Social Needs    Financial resource strain: Not on file    Food insecurity     Worry: Not on file     Inability: Not on file    Transportation needs     Medical: Not on file     Non-medical: Not on file   Tobacco Use    Smoking status: Never Smoker    Smokeless tobacco: Never Used   Substance and Sexual Activity    Alcohol use: No    Drug use: No    Sexual activity: Yes     Partners: Male     Birth control/protection: Surgical     Comment: [facility]   Lifestyle  "   Physical activity     Days per week: Not on file     Minutes per session: Not on file    Stress: Not on file   Relationships    Social connections     Talks on phone: Not on file     Gets together: Not on file     Attends Congregational service: Not on file     Active member of club or organization: Not on file     Attends meetings of clubs or organizations: Not on file     Relationship status: Not on file   Other Topics Concern    Not on file   Social History Narrative    Not on file     Family History   Problem Relation Age of Onset    Hypertension Mother     Stroke Mother     Heart attack Mother     Cancer Mother         bile duct cancer    Heart murmur Father      OB History        3    Para   1    Term   1       0    AB   2    Living   1       SAB   2    TAB   0    Ectopic   0    Multiple   0    Live Births   1                 BP (!) 140/84   Ht 5' 5" (1.651 m)   Wt 87.5 kg (192 lb 14.4 oz)   LMP 2019 (Exact Date)   BMI 32.10 kg/m²       ROS:  GENERAL: Denies weight gain or weight loss. Feeling well overall.    CARDIOVASCULAR: Denies palpitations or left sided chest pain.   ABDOMEN: No abdominal pain, constipation, diarrhea, nausea, vomiting or rectal bleeding.   URINARY: HPI  REPRODUCTIVE: See HPI.     PHYSICAL EXAM:  APPEARANCE: Well nourished, well developed, in no acute distress.  AFFECT: WNL, alert and oriented x 3  ABDOMEN: Soft.  No tenderness or masses.  No hepatosplenomegaly.  No hernias.  PELVIC: Normal external genitalia without lesions. Vagina thick, white dischrge    1. BV (bacterial vaginosis)  C. trachomatis/N. gonorrhoeae by AMP DNA    POCT Wet Prep   2. Dysuria  Urine culture     PLAN:  Urine sent for cx  Wet prep> 50% clue cells   Cleocin rx                   "

## 2020-09-20 DIAGNOSIS — N30.00 ACUTE CYSTITIS WITHOUT HEMATURIA: Primary | ICD-10-CM

## 2020-09-21 LAB — BACTERIA UR CULT: ABNORMAL

## 2020-09-21 RX ORDER — NITROFURANTOIN 25; 75 MG/1; MG/1
100 CAPSULE ORAL 2 TIMES DAILY
Qty: 10 CAPSULE | Refills: 0 | Status: SHIPPED | OUTPATIENT
Start: 2020-09-21 | End: 2020-09-26

## 2020-10-02 ENCOUNTER — LAB VISIT (OUTPATIENT)
Dept: LAB | Facility: HOSPITAL | Age: 25
End: 2020-10-02
Attending: NURSE PRACTITIONER
Payer: MEDICAID

## 2020-10-02 DIAGNOSIS — N30.00 ACUTE CYSTITIS WITHOUT HEMATURIA: ICD-10-CM

## 2020-10-02 PROCEDURE — 87186 SC STD MICRODIL/AGAR DIL: CPT

## 2020-10-02 PROCEDURE — 87077 CULTURE AEROBIC IDENTIFY: CPT

## 2020-10-02 PROCEDURE — 87086 URINE CULTURE/COLONY COUNT: CPT

## 2020-10-02 PROCEDURE — 87088 URINE BACTERIA CULTURE: CPT

## 2020-10-04 DIAGNOSIS — N30.00 ACUTE CYSTITIS WITHOUT HEMATURIA: Primary | ICD-10-CM

## 2020-10-04 RX ORDER — NITROFURANTOIN 25; 75 MG/1; MG/1
100 CAPSULE ORAL 2 TIMES DAILY
Qty: 14 CAPSULE | Refills: 0 | Status: SHIPPED | OUTPATIENT
Start: 2020-10-04 | End: 2020-10-11

## 2020-10-04 NOTE — PROGRESS NOTES
Please call patient and inform her that the urine cx was positive. I called in Macrobid and she will need a repeat urine cx in 2 weeks.

## 2020-10-05 LAB — BACTERIA UR CULT: ABNORMAL

## 2020-10-05 NOTE — PROGRESS NOTES
Please call patient and inform her that urine cx is positive and she needs to complete the Macrobid and repeat the urine cx in 2 weeks.

## 2020-10-07 ENCOUNTER — PATIENT MESSAGE (OUTPATIENT)
Dept: OBSTETRICS AND GYNECOLOGY | Facility: CLINIC | Age: 25
End: 2020-10-07

## 2020-12-10 ENCOUNTER — OFFICE VISIT (OUTPATIENT)
Dept: OBSTETRICS AND GYNECOLOGY | Facility: CLINIC | Age: 25
End: 2020-12-10
Payer: MEDICAID

## 2020-12-10 ENCOUNTER — HOSPITAL ENCOUNTER (OUTPATIENT)
Dept: RADIOLOGY | Facility: HOSPITAL | Age: 25
Discharge: HOME | End: 2020-12-10
Attending: NURSE PRACTITIONER
Payer: MEDICAID

## 2020-12-10 VITALS
WEIGHT: 194.69 LBS | SYSTOLIC BLOOD PRESSURE: 130 MMHG | BODY MASS INDEX: 32.39 KG/M2 | DIASTOLIC BLOOD PRESSURE: 82 MMHG

## 2020-12-10 DIAGNOSIS — R35.0 URINARY FREQUENCY: Primary | ICD-10-CM

## 2020-12-10 DIAGNOSIS — Z87.442 HISTORY OF KIDNEY STONES: ICD-10-CM

## 2020-12-10 PROCEDURE — 99999 PR PBB SHADOW E&M-EST. PATIENT-LVL III: ICD-10-PCS | Mod: PBBFAC,,, | Performed by: NURSE PRACTITIONER

## 2020-12-10 PROCEDURE — 74018 XR ABDOMEN AP 1 VIEW: ICD-10-PCS | Mod: 26,,, | Performed by: RADIOLOGY

## 2020-12-10 PROCEDURE — 74018 RADEX ABDOMEN 1 VIEW: CPT | Mod: 26,,, | Performed by: RADIOLOGY

## 2020-12-10 PROCEDURE — 99214 PR OFFICE/OUTPT VISIT, EST, LEVL IV, 30-39 MIN: ICD-10-PCS | Mod: S$PBB,,, | Performed by: NURSE PRACTITIONER

## 2020-12-10 PROCEDURE — 74018 RADEX ABDOMEN 1 VIEW: CPT | Mod: TC

## 2020-12-10 PROCEDURE — 99213 OFFICE O/P EST LOW 20 MIN: CPT | Mod: PBBFAC,25 | Performed by: NURSE PRACTITIONER

## 2020-12-10 PROCEDURE — 99999 PR PBB SHADOW E&M-EST. PATIENT-LVL III: CPT | Mod: PBBFAC,,, | Performed by: NURSE PRACTITIONER

## 2020-12-10 PROCEDURE — 87086 URINE CULTURE/COLONY COUNT: CPT

## 2020-12-10 PROCEDURE — 99214 OFFICE O/P EST MOD 30 MIN: CPT | Mod: S$PBB,,, | Performed by: NURSE PRACTITIONER

## 2020-12-10 RX ORDER — SULFAMETHOXAZOLE AND TRIMETHOPRIM 800; 160 MG/1; MG/1
1 TABLET ORAL 2 TIMES DAILY
Qty: 14 TABLET | Refills: 0 | Status: SHIPPED | OUTPATIENT
Start: 2020-12-10 | End: 2020-12-17

## 2020-12-10 RX ORDER — FLUCONAZOLE 150 MG/1
150 TABLET ORAL DAILY
Qty: 2 TABLET | Refills: 0 | Status: SHIPPED | OUTPATIENT
Start: 2020-12-10 | End: 2020-12-12

## 2020-12-10 NOTE — PROGRESS NOTES
CC: Dysuria    Madelyn Aguilar is a 25 y.o. female  presents for dysuria for several days. Patient has been followed for recurrent UTIs.Past two urine cx indicated e-coli and Citrobacter Koseri. Patient is s/p benign hysterectomy. Patient denies pelvic or flank pain. Reports lower back pain. No fever, N/V or hematuria. Patient reports that she does have a remote history of renal stone disease, denies surgical intervention related to stones. Urine in clinic indicate Leucocytes and trace blood, no nitrates. No OTC AZO.    Past Medical History:   Diagnosis Date    Abnormal Pap smear of cervix     Anemia     Arthritis     pau arms/legs    GERD (gastroesophageal reflux disease)     Herpes simplex virus (HSV) infection     Pelvic pain 2019    Syphilis, congenital      Past Surgical History:   Procedure Laterality Date     SECTION  2013    HYSTERECTOMY      LOOP ELECTROSURGICAL EXCISION PROCEDURE (LEEP)  2019    hsil on colpo    ROBOT-ASSISTED LAPAROSCOPIC ABDOMINAL HYSTERECTOMY USING DA SULEMA XI N/A 2019    Procedure: XI ROBOTIC HYSTERECTOMY;  Surgeon: Norma Chen MD;  Location: [facility] OR;  Service: OB/GYN;  Laterality: N/A;    ROBOT-ASSISTED SURGICAL REMOVAL OF FALLOPIAN TUBE USING DA SULEMA XI Bilateral 2019    Procedure: XI ROBOTIC SALPINGECTOMY;  Surgeon: Norma Chen MD;  Location: [facility] OR;  Service: OB/GYN;  Laterality: Bilateral;     Social History     Socioeconomic History    Marital status: Single     Spouse name: Not on file    Number of children: Not on file    Years of education: Not on file    Highest education level: Not on file   Occupational History    Not on file   Social Needs    Financial resource strain: Not on file    Food insecurity     Worry: Not on file     Inability: Not on file    Transportation needs     Medical: Not on file     Non-medical: Not on file   Tobacco Use    Smoking status: Never Smoker    Smokeless tobacco:  Never Used   Substance and Sexual Activity    Alcohol use: No    Drug use: No    Sexual activity: Yes     Partners: Male     Birth control/protection: Surgical     Comment: hyst   Lifestyle    Physical activity     Days per week: Not on file     Minutes per session: Not on file    Stress: Not on file   Relationships    Social connections     Talks on phone: Not on file     Gets together: Not on file     Attends Roman [employer] service: Not on file     Active member of club or organization: Not on file     Attends meetings of clubs or organizations: Not on file     Relationship status: Not on file   Other Topics Concern    Not on file   Social History Narrative    Not on file     Family History   Problem Relation Age of Onset    Hypertension Mother     Stroke Mother     Heart attack Mother     Cancer Mother         bile duct cancer    Heart murmur Father      OB History        3    Para   1    Term   1       0    AB   2    Living   1       SAB   2    TAB   0    Ectopic   0    Multiple   0    Live Births   1                 /82   Wt 88.3 kg (194 lb 10.7 oz)   LMP 2019 (Exact Date)   BMI 32.39 kg/m²       ROS:  CHEST: Denies chest pain or shortness of breath.   CARDIOVASCULAR: Denies palpitations or left sided chest pain.   ABDOMEN: No abdominal pain, constipation, diarrhea, nausea, vomiting or rectal bleeding.   URINARY: HPI  REPRODUCTIVE: See HPI.     PHYSICAL EXAM:  APPEARANCE: Obese female, in no acute distress.  AFFECT: WNL, alert and oriented x 3  ABDOMEN: Soft.  No tenderness or masses.  No CVA tenderness.       1. Urinary frequency  Urine culture    POCT URINE DIPSTICK WITHOUT MICROSCOPE   2. History of kidney stones  X-Ray Abdomen AP 1 View    Ambulatory referral/consult to Urology    PLAN:  Urine sent for cx  KUB  Referral to urology  Empirically treat,  Bactrim rx

## 2020-12-11 ENCOUNTER — OFFICE VISIT (OUTPATIENT)
Dept: UROLOGY | Facility: CLINIC | Age: 25
End: 2020-12-11
Payer: MEDICAID

## 2020-12-11 VITALS
DIASTOLIC BLOOD PRESSURE: 76 MMHG | WEIGHT: 190.5 LBS | BODY MASS INDEX: 31.7 KG/M2 | TEMPERATURE: 98 F | SYSTOLIC BLOOD PRESSURE: 126 MMHG

## 2020-12-11 DIAGNOSIS — N30.00 ACUTE CYSTITIS WITHOUT HEMATURIA: ICD-10-CM

## 2020-12-11 DIAGNOSIS — N20.0 NEPHROLITHIASIS: Primary | ICD-10-CM

## 2020-12-11 LAB
BILIRUB SERPL-MCNC: NORMAL MG/DL
BLOOD URINE, POC: NORMAL
CLARITY, POC UA: CLEAR
COLOR, POC UA: YELLOW
GLUCOSE UR QL STRIP: NORMAL
KETONES UR QL STRIP: NORMAL
LEUKOCYTE ESTERASE URINE, POC: NORMAL
NITRITE, POC UA: NORMAL
PH, POC UA: 7
PROTEIN, POC: NORMAL
SPECIFIC GRAVITY, POC UA: 1.01
UROBILINOGEN, POC UA: NORMAL

## 2020-12-11 PROCEDURE — 99213 OFFICE O/P EST LOW 20 MIN: CPT | Mod: PBBFAC | Performed by: UROLOGY

## 2020-12-11 PROCEDURE — 81002 URINALYSIS NONAUTO W/O SCOPE: CPT | Mod: PBBFAC | Performed by: UROLOGY

## 2020-12-11 PROCEDURE — 99999 PR PBB SHADOW E&M-EST. PATIENT-LVL III: CPT | Mod: PBBFAC,,, | Performed by: UROLOGY

## 2020-12-11 PROCEDURE — 99203 PR OFFICE/OUTPT VISIT, NEW, LEVL III, 30-44 MIN: ICD-10-PCS | Mod: S$PBB,,, | Performed by: UROLOGY

## 2020-12-11 PROCEDURE — 99999 PR PBB SHADOW E&M-EST. PATIENT-LVL III: ICD-10-PCS | Mod: PBBFAC,,, | Performed by: UROLOGY

## 2020-12-11 PROCEDURE — 99203 OFFICE O/P NEW LOW 30 MIN: CPT | Mod: S$PBB,,, | Performed by: UROLOGY

## 2020-12-11 RX ORDER — CEFDINIR 300 MG/1
300 CAPSULE ORAL 2 TIMES DAILY
Qty: 34 CAPSULE | Refills: 0 | Status: SHIPPED | OUTPATIENT
Start: 2020-12-11 | End: 2020-12-28

## 2020-12-11 NOTE — PROGRESS NOTES
Chief Complaint:   Encounter Diagnoses   Name Primary?    Nephrolithiasis Yes    Acute cystitis without hematuria        HPI:  25-year-old female who has been having some issues with UTIs since she had a hysterectomy last summer.  Patient states that she is getting 1 about every month, and may have 1 today.  Previous robotic hysterectomy in 2019.  No other pelvic surgeries, patient is complaining of no incontinence.  She states that she gets UTI the symptoms are increased frequency and urgency, with nocturia up to 5 times per evening.  Significant odor with intermittent dysuria.  Currently she states that she has no issues with her lower urinary tract symptom, as long she is not infection.  When she was 16-years-old she passed the stone, this was our only stone she has required no surgery for stones.  She has had no other urological history, last antibiotic was about a month ago.  She has had no gross hematuria, no microscopic hematuria, she has never been a smoker.  She has had no other gynecological history.  She has no family history of urological cancers or stones.  She feels that this all began after her surgery, mostly being treated with clindamycin the past.  She has intermittent constipation which she treats on her own.  There appears to be no correlation with intercourse or diet, she is attempting all preventive measures.  She has had 1  section.    Allergies:  Fish containing products and Sudafed [pseudoephedrine hcl]    Medications:  has a current medication list which includes the following prescription(s): fluconazole, multivitamin, sulfamethoxazole-trimethoprim 800-160mg, cefdinir, and l.acid/b.bifidum/b.animal/fos.    Review of Systems:  General: No fever, chills, fatigability, or weight loss.  Skin: No rashes, itching, or changes in color or texture of skin.  Chest: Denies ADNE, cyanosis, wheezing, cough, and sputum production.  Abdomen: Appetite fine. No weight loss. Denies diarrhea,  abdominal pain, hematemesis, or blood in stool.  Musculoskeletal: No joint stiffness or swelling. Denies back pain.  : As above.  All other review of systems negative.    PMH:   has a past medical history of Abnormal Pap smear of cervix, Anemia, Arthritis, GERD (gastroesophageal reflux disease), Herpes simplex virus (HSV) infection, Pelvic pain (2019), and Syphilis, congenital.    PSH:   has a past surgical history that includes  section (2013); Loop electrosurgical excision procedure (LEEP) (2019); Robot-assisted laparoscopic abdominal hysterectomy using da Huong Xi (N/A, 2019); Robot-assisted surgical removal of fallopian tube using da Huong Xi (Bilateral, 2019); and Hysterectomy.    FamHx: family history includes Cancer in her mother; Heart attack in her mother; Heart murmur in her father; Hypertension in her mother; Stroke in her mother.    SocHx:  reports that she has never smoked. She has never used smokeless tobacco. She reports that she does not drink alcohol or use drugs.      Physical Exam:  Vitals:    20 1025   BP: 126/76   Temp: 97.7 °F (36.5 °C)     General: A&Ox3, no apparent distress, no deformities  Neck: No masses, normal ROM  Lungs: normal inspiration, no use of accessory muscles  Heart: normal pulse, no arrhythmias  Abdomen: Soft, NT, ND, no masses, no hernias, no hepatosplenomegaly, slight suprapubic and right flank discomfort to palpation  Skin: The skin is warm and dry. No jaundice.  Ext: No c/c/e.    Labs/Studies:   Urinalysis normal   Urine culture pending   Urine culture Citrobacter 10/20  Urine culture E coli 3/20  KUB normal     Impression/Plan:     1.  UTI- will proceed with CT scan to rule out stone disease as a cause for these infections.  She does have history of stone passage years ago.  Patient is otherwise asymptomatic in regards to stone disease though.  She will begin cranberry over-the-counter medications, she is already  attempting conservative measures.  Will go ahead and treat with cefdinir b.i.d. x7 days then daily for 20 days for suppression dosing and I will re-evaluate in 1 month.  Further therapeutic and diagnostic options will be warranted if this does not assist.    2.  Nephrolithiasis- please see above.

## 2020-12-11 NOTE — LETTER
December 11, 2020      Sarah Stapleton NP  [address] [place] [address] [address] [id number]           [facility]  [address] [place] [address] [address] [address]  Phone: [phone]  Fax: [phone]          Patient: Madelyn Aguilar   MR Number: [id number]   YOB: 1995   Date of Visit: 12/11/2020       Dear Sarah Stapleton:    Thank you for referring Madelyn Aguilar to me for evaluation. Attached you will find relevant portions of my assessment and plan of care.    If you have questions, please do not hesitate to call me. I look forward to following Madelyn Aguilar along with you.    Sincerely,    Maxim Jarvis MD    Enclosure  CC:  No Recipients    If you would like to receive this communication electronically, please contact [email or web address] or [phone] to request more information on [employer] Link access.    For providers and/or their staff who would like to refer a patient to [facility], please contact us through our one-stop-shop provider referral line, [facility] , at [phone].    If you feel you have received this communication in error or would no longer like to receive these types of communications, please e-mail [email or web address]

## 2020-12-12 LAB — BACTERIA UR CULT: NORMAL

## 2020-12-18 ENCOUNTER — HOSPITAL ENCOUNTER (OUTPATIENT)
Dept: RADIOLOGY | Facility: HOSPITAL | Age: 25
Discharge: HOME | End: 2020-12-18
Attending: UROLOGY
Payer: MEDICAID

## 2020-12-18 DIAGNOSIS — N20.0 NEPHROLITHIASIS: ICD-10-CM

## 2020-12-18 PROCEDURE — 74176 CT ABD & PELVIS W/O CONTRAST: CPT | Mod: TC

## 2021-05-10 ENCOUNTER — PATIENT MESSAGE (OUTPATIENT)
Dept: RESEARCH | Facility: HOSPITAL | Age: 26
End: 2021-05-10

## 2022-05-25 ENCOUNTER — OFFICE VISIT (OUTPATIENT)
Dept: OBSTETRICS AND GYNECOLOGY | Facility: CLINIC | Age: 27
End: 2022-05-25
Payer: MEDICAID

## 2022-05-25 VITALS
SYSTOLIC BLOOD PRESSURE: 120 MMHG | HEIGHT: 65 IN | BODY MASS INDEX: 32.99 KG/M2 | DIASTOLIC BLOOD PRESSURE: 86 MMHG | WEIGHT: 198 LBS

## 2022-05-25 DIAGNOSIS — N95.1 VASOMOTOR SYMPTOMS DUE TO MENOPAUSE: Primary | ICD-10-CM

## 2022-05-25 DIAGNOSIS — N89.8 VAGINAL ITCHING: ICD-10-CM

## 2022-05-25 DIAGNOSIS — N89.8 VAGINAL ODOR: ICD-10-CM

## 2022-05-25 DIAGNOSIS — N90.89 VULVAR IRRITATION: ICD-10-CM

## 2022-05-25 PROCEDURE — 3074F PR MOST RECENT SYSTOLIC BLOOD PRESSURE < 130 MM HG: ICD-10-PCS | Mod: CPTII,,, | Performed by: NURSE PRACTITIONER

## 2022-05-25 PROCEDURE — 87801 DETECT AGNT MULT DNA AMPLI: CPT | Performed by: NURSE PRACTITIONER

## 2022-05-25 PROCEDURE — 99999 PR PBB SHADOW E&M-EST. PATIENT-LVL III: ICD-10-PCS | Mod: PBBFAC,,, | Performed by: NURSE PRACTITIONER

## 2022-05-25 PROCEDURE — 99213 OFFICE O/P EST LOW 20 MIN: CPT | Mod: S$PBB,,, | Performed by: NURSE PRACTITIONER

## 2022-05-25 PROCEDURE — 99999 PR PBB SHADOW E&M-EST. PATIENT-LVL III: CPT | Mod: PBBFAC,,, | Performed by: NURSE PRACTITIONER

## 2022-05-25 PROCEDURE — 3008F BODY MASS INDEX DOCD: CPT | Mod: CPTII,,, | Performed by: NURSE PRACTITIONER

## 2022-05-25 PROCEDURE — 87481 CANDIDA DNA AMP PROBE: CPT | Mod: 59 | Performed by: NURSE PRACTITIONER

## 2022-05-25 PROCEDURE — 3079F DIAST BP 80-89 MM HG: CPT | Mod: CPTII,,, | Performed by: NURSE PRACTITIONER

## 2022-05-25 PROCEDURE — 99213 OFFICE O/P EST LOW 20 MIN: CPT | Mod: PBBFAC | Performed by: NURSE PRACTITIONER

## 2022-05-25 PROCEDURE — 1160F RVW MEDS BY RX/DR IN RCRD: CPT | Mod: CPTII,,, | Performed by: NURSE PRACTITIONER

## 2022-05-25 PROCEDURE — 1159F PR MEDICATION LIST DOCUMENTED IN MEDICAL RECORD: ICD-10-PCS | Mod: CPTII,,, | Performed by: NURSE PRACTITIONER

## 2022-05-25 PROCEDURE — 3079F PR MOST RECENT DIASTOLIC BLOOD PRESSURE 80-89 MM HG: ICD-10-PCS | Mod: CPTII,,, | Performed by: NURSE PRACTITIONER

## 2022-05-25 PROCEDURE — 3074F SYST BP LT 130 MM HG: CPT | Mod: CPTII,,, | Performed by: NURSE PRACTITIONER

## 2022-05-25 PROCEDURE — 1160F PR REVIEW ALL MEDS BY PRESCRIBER/CLIN PHARMACIST DOCUMENTED: ICD-10-PCS | Mod: CPTII,,, | Performed by: NURSE PRACTITIONER

## 2022-05-25 PROCEDURE — 1159F MED LIST DOCD IN RCRD: CPT | Mod: CPTII,,, | Performed by: NURSE PRACTITIONER

## 2022-05-25 PROCEDURE — 3008F PR BODY MASS INDEX (BMI) DOCUMENTED: ICD-10-PCS | Mod: CPTII,,, | Performed by: NURSE PRACTITIONER

## 2022-05-25 PROCEDURE — 99213 PR OFFICE/OUTPT VISIT, EST, LEVL III, 20-29 MIN: ICD-10-PCS | Mod: S$PBB,,, | Performed by: NURSE PRACTITIONER

## 2022-05-25 RX ORDER — CLOTRIMAZOLE AND BETAMETHASONE DIPROPIONATE 10; .64 MG/G; MG/G
CREAM TOPICAL
Qty: 15 G | Refills: 1 | Status: SHIPPED | OUTPATIENT
Start: 2022-05-25 | End: 2023-05-25

## 2022-05-25 RX ORDER — METRONIDAZOLE 500 MG/1
500 TABLET ORAL EVERY 12 HOURS
Qty: 14 TABLET | Refills: 0 | Status: SHIPPED | OUTPATIENT
Start: 2022-05-25 | End: 2022-06-01

## 2022-05-25 RX ORDER — ESTRADIOL 2 MG/1
2 TABLET ORAL DAILY
Qty: 30 TABLET | Refills: 2 | Status: SHIPPED | OUTPATIENT
Start: 2022-05-25 | End: 2023-02-07 | Stop reason: SDUPTHER

## 2022-05-25 RX ORDER — ESTRADIOL 1 MG/1
1 TABLET ORAL DAILY
Qty: 30 TABLET | Refills: 2 | Status: SHIPPED | OUTPATIENT
Start: 2022-05-25 | End: 2022-05-25 | Stop reason: ALTCHOICE

## 2022-05-25 RX ORDER — FLUCONAZOLE 100 MG/1
TABLET ORAL
Qty: 3 TABLET | Refills: 0 | Status: SHIPPED | OUTPATIENT
Start: 2022-05-25 | End: 2023-02-07

## 2022-05-25 NOTE — PROGRESS NOTES
Chief Complaint   Patient presents with    Vaginal Discharge        Madelyn Aguilar is a 26 y.o. female    White discharge with mild odor times 3 days   Desires HRT due hot flashes, mood swings, fatigue  Also complains of external vulva irritation     Past Medical History:   Diagnosis Date    Abnormal Pap smear of cervix     Anemia     Arthritis     pau arms/legs    GERD (gastroesophageal reflux disease)     Herpes simplex virus (HSV) infection     Pelvic pain 2019    Syphilis, congenital      Past Surgical History:   Procedure Laterality Date     SECTION  2013    HYSTERECTOMY      LOOP ELECTROSURGICAL EXCISION PROCEDURE (LEEP)  2019    hsil on colpo    ROBOT-ASSISTED LAPAROSCOPIC ABDOMINAL HYSTERECTOMY USING DA SULEMA XI N/A 2019    Procedure: XI ROBOTIC HYSTERECTOMY;  Surgeon: Norma Chen MD;  Location: [facility] OR;  Service: OB/GYN;  Laterality: N/A;    ROBOT-ASSISTED SURGICAL REMOVAL OF FALLOPIAN TUBE USING DA SULEMA XI Bilateral 2019    Procedure: XI ROBOTIC SALPINGECTOMY;  Surgeon: Norma Chen MD;  Location: [facility] OR;  Service: OB/GYN;  Laterality: Bilateral;     Social History     Tobacco Use    Smoking status: Never Smoker    Smokeless tobacco: Never Used   Substance Use Topics    Alcohol use: No    Drug use: No     Family History   Problem Relation Age of Onset    Hypertension Mother     Stroke Mother     Heart attack Mother     Cancer Mother         bile duct cancer    Heart murmur Father      OB History    Para Term  AB Living   3 1 1 0 2 1   SAB IAB Ectopic Multiple Live Births   2 0 0 0 1      # Outcome Date GA Lbr Dusty/2nd Weight Sex Delivery Anes PTL Lv   3 Term 13    F CS-Unspec   JAYCOB      Complications: Failure to Progress in First Stage   2 SAB            1 SAB                Medication List with Changes/Refills   New Medications    CLOTRIMAZOLE-BETAMETHASONE 1-0.05% (LOTRISONE) CREAM    Apply to affected  "area 2 times daily    ESTRADIOL (ESTRACE) 2 MG TABLET    Take 1 tablet (2 mg total) by mouth once daily.    FLUCONAZOLE (DIFLUCAN) 100 MG TABLET    Take one tablet today and may repeat every three days up to 3 doses    METRONIDAZOLE (FLAGYL) 500 MG TABLET    Take 1 tablet (500 mg total) by mouth every 12 (twelve) hours. for 7 days   Current Medications    L.ACID/B.BIFIDUM/B.ANIMAL/FOS (PROBIOTIC COMPLEX ORAL)    Take 1 capsule by mouth once daily.    MULTIVITAMIN CAPSULE    Take 1 capsule by mouth once daily.      /86   Ht 5' 5" (1.651 m)   Wt 89.8 kg (197 lb 15.6 oz)   LMP 07/11/2019 (Exact Date)   BMI 32.94 kg/m²     ROS:  Review of Systems      PHYSICAL EXAM:  Physical Exam  Genitourinary:      Vulva exam comments: Normal .      Vaginal cuff intact.     Vaginal exam comments: Normal .      Cervix is absent.      Uterus is absent.             ASSESSMENT and PLAN:    ICD-10-CM ICD-9-CM    1. Vasomotor symptoms due to menopause  N95.1 627.2 estradioL (ESTRACE) 2 MG tablet      DISCONTINUED: estradioL (ESTRACE) 1 MG tablet   2. Vaginal odor  N89.8 625.8 Vaginosis Screen by DNA Probe      metroNIDAZOLE (FLAGYL) 500 MG tablet   3. Vulvar irritation  N90.89 624.8 clotrimazole-betamethasone 1-0.05% (LOTRISONE) cream   4. Vaginal itching  N89.8 698.1 fluconazole (DIFLUCAN) 100 MG tablet   declines 1 mg would like to start on 2 mg  Prone to yeast infections with antibiotic use     Jeni Enrique NP     "

## 2022-05-26 ENCOUNTER — PATIENT MESSAGE (OUTPATIENT)
Dept: OBSTETRICS AND GYNECOLOGY | Facility: CLINIC | Age: 27
End: 2022-05-26
Payer: MEDICAID

## 2023-01-06 ENCOUNTER — TELEPHONE (OUTPATIENT)
Dept: OBSTETRICS AND GYNECOLOGY | Facility: CLINIC | Age: 28
End: 2023-01-06
Payer: MEDICAID

## 2023-01-06 NOTE — TELEPHONE ENCOUNTER
Spoke with pt, pt appt sent out for scheduling for 02/06/23 in [place], pt voiced understanding.     ----- Message from Cholo Daniel sent at 1/6/2023  1:35 PM CST -----  Contact: [phone]/  Pt is calling regarding appt . Please call back at [phone]/Quat-[phone] . Thanks/gio

## 2023-02-07 ENCOUNTER — LAB VISIT (OUTPATIENT)
Dept: LAB | Facility: HOSPITAL | Age: 28
End: 2023-02-07
Attending: NURSE PRACTITIONER
Payer: MEDICAID

## 2023-02-07 ENCOUNTER — OFFICE VISIT (OUTPATIENT)
Dept: OBSTETRICS AND GYNECOLOGY | Facility: CLINIC | Age: 28
End: 2023-02-07
Payer: MEDICAID

## 2023-02-07 VITALS
SYSTOLIC BLOOD PRESSURE: 134 MMHG | HEIGHT: 65 IN | BODY MASS INDEX: 31.65 KG/M2 | DIASTOLIC BLOOD PRESSURE: 86 MMHG | WEIGHT: 189.94 LBS

## 2023-02-07 DIAGNOSIS — Z01.419 ENCOUNTER FOR GYNECOLOGICAL EXAMINATION WITHOUT ABNORMAL FINDING: ICD-10-CM

## 2023-02-07 DIAGNOSIS — Z11.3 ENCOUNTER FOR SCREENING FOR INFECTIONS WITH PREDOMINANTLY SEXUAL MODE OF TRANSMISSION: ICD-10-CM

## 2023-02-07 DIAGNOSIS — R23.2 HOT FLASHES: ICD-10-CM

## 2023-02-07 DIAGNOSIS — A50.9 SYPHILIS, CONGENITAL: ICD-10-CM

## 2023-02-07 DIAGNOSIS — B00.9 HSV (HERPES SIMPLEX VIRUS) INFECTION: ICD-10-CM

## 2023-02-07 DIAGNOSIS — Z01.419 ENCOUNTER FOR GYNECOLOGICAL EXAMINATION WITHOUT ABNORMAL FINDING: Primary | ICD-10-CM

## 2023-02-07 DIAGNOSIS — Z98.890 HISTORY OF LOOP ELECTRICAL EXCISION PROCEDURE (LEEP): ICD-10-CM

## 2023-02-07 LAB
FSH SERPL-ACNC: 2.35 MIU/ML
HIV 1+2 AB+HIV1 P24 AG SERPL QL IA: NORMAL
TSH SERPL DL<=0.005 MIU/L-ACNC: 1.08 UIU/ML (ref 0.4–4)

## 2023-02-07 PROCEDURE — 99395 PREV VISIT EST AGE 18-39: CPT | Mod: S$PBB,,, | Performed by: NURSE PRACTITIONER

## 2023-02-07 PROCEDURE — 3008F BODY MASS INDEX DOCD: CPT | Mod: CPTII,,, | Performed by: NURSE PRACTITIONER

## 2023-02-07 PROCEDURE — 1160F RVW MEDS BY RX/DR IN RCRD: CPT | Mod: CPTII,,, | Performed by: NURSE PRACTITIONER

## 2023-02-07 PROCEDURE — 1159F PR MEDICATION LIST DOCUMENTED IN MEDICAL RECORD: ICD-10-PCS | Mod: CPTII,,, | Performed by: NURSE PRACTITIONER

## 2023-02-07 PROCEDURE — 3008F PR BODY MASS INDEX (BMI) DOCUMENTED: ICD-10-PCS | Mod: CPTII,,, | Performed by: NURSE PRACTITIONER

## 2023-02-07 PROCEDURE — 99395 PR PREVENTIVE VISIT,EST,18-39: ICD-10-PCS | Mod: S$PBB,,, | Performed by: NURSE PRACTITIONER

## 2023-02-07 PROCEDURE — 86780 TREPONEMA PALLIDUM: CPT | Performed by: NURSE PRACTITIONER

## 2023-02-07 PROCEDURE — 36415 COLL VENOUS BLD VENIPUNCTURE: CPT | Mod: PO | Performed by: NURSE PRACTITIONER

## 2023-02-07 PROCEDURE — 3079F DIAST BP 80-89 MM HG: CPT | Mod: CPTII,,, | Performed by: NURSE PRACTITIONER

## 2023-02-07 PROCEDURE — 86593 SYPHILIS TEST NON-TREP QUANT: CPT | Performed by: NURSE PRACTITIONER

## 2023-02-07 PROCEDURE — 99213 OFFICE O/P EST LOW 20 MIN: CPT | Mod: PBBFAC,PN | Performed by: NURSE PRACTITIONER

## 2023-02-07 PROCEDURE — 87389 HIV-1 AG W/HIV-1&-2 AB AG IA: CPT | Performed by: NURSE PRACTITIONER

## 2023-02-07 PROCEDURE — 84443 ASSAY THYROID STIM HORMONE: CPT | Performed by: NURSE PRACTITIONER

## 2023-02-07 PROCEDURE — 3075F PR MOST RECENT SYSTOLIC BLOOD PRESS GE 130-139MM HG: ICD-10-PCS | Mod: CPTII,,, | Performed by: NURSE PRACTITIONER

## 2023-02-07 PROCEDURE — 87591 N.GONORRHOEAE DNA AMP PROB: CPT | Performed by: NURSE PRACTITIONER

## 2023-02-07 PROCEDURE — 83001 ASSAY OF GONADOTROPIN (FSH): CPT | Performed by: NURSE PRACTITIONER

## 2023-02-07 PROCEDURE — 3075F SYST BP GE 130 - 139MM HG: CPT | Mod: CPTII,,, | Performed by: NURSE PRACTITIONER

## 2023-02-07 PROCEDURE — 1160F PR REVIEW ALL MEDS BY PRESCRIBER/CLIN PHARMACIST DOCUMENTED: ICD-10-PCS | Mod: CPTII,,, | Performed by: NURSE PRACTITIONER

## 2023-02-07 PROCEDURE — 99999 PR PBB SHADOW E&M-EST. PATIENT-LVL III: CPT | Mod: PBBFAC,,, | Performed by: NURSE PRACTITIONER

## 2023-02-07 PROCEDURE — 80074 ACUTE HEPATITIS PANEL: CPT | Performed by: NURSE PRACTITIONER

## 2023-02-07 PROCEDURE — 3079F PR MOST RECENT DIASTOLIC BLOOD PRESSURE 80-89 MM HG: ICD-10-PCS | Mod: CPTII,,, | Performed by: NURSE PRACTITIONER

## 2023-02-07 PROCEDURE — 88175 CYTOPATH C/V AUTO FLUID REDO: CPT | Performed by: NURSE PRACTITIONER

## 2023-02-07 PROCEDURE — 99999 PR PBB SHADOW E&M-EST. PATIENT-LVL III: ICD-10-PCS | Mod: PBBFAC,,, | Performed by: NURSE PRACTITIONER

## 2023-02-07 PROCEDURE — 1159F MED LIST DOCD IN RCRD: CPT | Mod: CPTII,,, | Performed by: NURSE PRACTITIONER

## 2023-02-07 PROCEDURE — 86592 SYPHILIS TEST NON-TREP QUAL: CPT | Performed by: NURSE PRACTITIONER

## 2023-02-07 RX ORDER — ACYCLOVIR 400 MG/1
400 TABLET ORAL 2 TIMES DAILY
Qty: 60 TABLET | Refills: 11 | Status: SHIPPED | OUTPATIENT
Start: 2023-02-07 | End: 2024-02-09

## 2023-02-07 RX ORDER — ERGOCALCIFEROL 1.25 MG/1
50000 CAPSULE ORAL
COMMUNITY

## 2023-02-07 RX ORDER — ESTRADIOL 2 MG/1
1 TABLET ORAL EVERY MORNING
COMMUNITY
End: 2023-02-07 | Stop reason: SDUPTHER

## 2023-02-07 RX ORDER — ESTRADIOL 2 MG/1
2 TABLET ORAL DAILY
Qty: 90 TABLET | Refills: 3 | Status: SHIPPED | OUTPATIENT
Start: 2023-02-07 | End: 2024-02-07

## 2023-02-07 NOTE — PROGRESS NOTES
Subjective:       Patient ID: Madelyn Aguilar is a 27 y.o. female.    Chief Complaint:  Well Woman      History of Present Illness  HPI  Annual Exam-Premenopausal   presents for annual exam. The patient is sexually active; MM relationship x4 years; does desire STD testing. Possible exposure.  GYN screening history: last pap: approximate date 12/10/2018 and was normal. LEEP 2019.      Needs refill on acyclovir and estrace.  Suppressive therapy.  Started estrace 2mg 2022 for hot flashes and mood swings; does not feel like it is helping with those symptoms.  Hyst with bilateral salpingectomy; bilateral ovaries preserved  for pelvic pain and bleeding.  Unsure if she still has ovaries.  Would like to have her labs checked.    Congenital syphilis.  Worried that levels may be increasing as she feels symptomatic.  Took leave from work to feel better.  Quant was 1:8 the last two times she had it checked with treatment both times.    GYN & OB History  Patient's last menstrual period was 2019 (exact date).   Date of Last Pap: 2023    OB History    Para Term  AB Living   3 1 1 0 2 1   SAB IAB Ectopic Multiple Live Births   2 0 0 0 1      # Outcome Date GA Lbr Dusty/2nd Weight Sex Delivery Anes PTL Lv   3 Term 13    F CS-Unspec   JAYCOB      Complications: Failure to Progress in First Stage   2 SAB            1 SAB                Review of Systems  Review of Systems   Constitutional:  Negative for activity change, appetite change, chills, fatigue and fever.   HENT:  Negative for nasal congestion and mouth sores.    Respiratory:  Negative for cough, shortness of breath and wheezing.    Cardiovascular:  Negative for chest pain.   Gastrointestinal:  Negative for abdominal pain, constipation, diarrhea, nausea and vomiting.   Endocrine: Negative for hair loss and hot flashes.   Genitourinary:  Positive for hot flashes. Negative for bladder incontinence, decreased libido, dyspareunia,  dysuria, frequency, genital sores, pelvic pain, urgency, vaginal discharge, vaginal pain, urinary incontinence, postcoital bleeding, postmenopausal bleeding, vaginal dryness and vaginal odor.   Musculoskeletal:  Negative for back pain.   Integumentary:  Negative for breast mass, nipple discharge, breast skin changes and breast tenderness.   Neurological:  Negative for headaches.   Hematological:  Negative for adenopathy.   Psychiatric/Behavioral:  Negative for depression. The patient is not nervous/anxious.         + mood swings   All other systems reviewed and are negative.  Breast: Negative for breast self exam, lump, mass, mastodynia, nipple discharge, skin changes and tenderness        Objective:      Physical Exam:   Constitutional: She is oriented to person, place, and time. She appears well-developed and well-nourished. No distress.    HENT:   Head: Normocephalic and atraumatic.   Nose: Nose normal.    Eyes: Pupils are equal, round, and reactive to light. Conjunctivae and EOM are normal. Right eye exhibits no discharge. Left eye exhibits no discharge.     Cardiovascular:  Normal rate, regular rhythm and normal heart sounds.      Exam reveals no gallop, no friction rub, no clubbing, no cyanosis and no edema.       No murmur heard.   Pulmonary/Chest: Effort normal and breath sounds normal. No respiratory distress. She has no decreased breath sounds. She has no wheezes. She has no rhonchi. She has no rales. She exhibits no tenderness.   Breast exam deferred per pt        Abdominal: Soft. Bowel sounds are normal. She exhibits no distension. There is no abdominal tenderness. There is no rebound and no guarding. Hernia confirmed negative in the right inguinal area and confirmed negative in the left inguinal area.     Genitourinary:    Inguinal canal, vagina, uterus, right adnexa and left adnexa normal.   Rectum:      No external hemorrhoid.      Pelvic exam was performed with patient supine.   The external female  genitalia was normal.   No external genitalia lesions identified,Genitalia hair distrobution normal .   Labial bartholins normal.There is no rash, tenderness, lesion or injury on the right labia. There is no rash, tenderness, lesion or injury on the left labia. Cervix is normal. Right adnexum displays no mass, no tenderness and no fullness. Left adnexum displays no mass, no tenderness and no fullness. No erythema,  no vaginal discharge, tenderness or bleeding in the vagina.    No foreign body in the vagina.      No signs of injury in the vagina.   Vagina was moist.Cervix exhibits no motion tenderness, no lesion, no discharge, no friability, no lesion, no tenderness and no polyp.    pap smear completedUerus contour normal  Uterus is not enlarged and not tender. Normal urethral meatus.Urethral Meatus exhibits: urethral lesion and prolapsedUrethra findings: no urethral mass, no tenderness and no urethral scarringBladder findings: no bladder distention and no bladder tenderness          Musculoskeletal: Normal range of motion and moves all extremeties.      Lymphadenopathy: No inguinal adenopathy noted on the right or left side.    Neurological: She is alert and oriented to person, place, and time.    Skin: Skin is warm and dry. No rash noted. She is not diaphoretic. No cyanosis or erythema. No pallor. Nails show no clubbing.    Psychiatric: She has a normal mood and affect. Her speech is normal and behavior is normal. Judgment and thought content normal.           Assessment:        1. Encounter for gynecological examination without abnormal finding    2. HSV (herpes simplex virus) infection    3. Hot flashes    4. History of loop electrical excision procedure (LEEP)    5. Encounter for screening for infections with predominantly sexual mode of transmission    6. Syphilis, congenital               Plan:   Labs today  Refill sent for acyclovir    Discussed menopause vs. Surgical menopause and other causes of sx.  Pt  does not like being on hormones so would like further investigation  A full discussion of the benefit-risk ratio of hormonal replacement therapy was carried out. Improvement in vasomotor and other climacteric symptoms is discussed, including possible improvements in sleep and mood. Reduction of risk for osteoporosis was explained. We discussed the study data showing increased risk of thrombo-embolic events such as myocardial infarction, stroke and also possibly breast cancer with estrogen replacement, and how this might affect her. The range of side effects such as breast tenderness, weight gain and including possible increases in lifetime risk of breast cancer and possible thrombotic complications was discussed. We also discussed ACOG's recommendation to use hormone replacement therapy for the relief of hot flashes alone and to be on the lowest dose possible for the shortest amount of time.  Alternative such as herbal and soy-based products were reviewed. All of her questions about this therapy were answered.    Reviewed updated recommendations for pap smears (every 3 years) in low risk patients.  Recommend annual pelvic exams.  Reviewed recommendations for annual CBE.  Next pap due in 2026.   RTC 1 year or sooner prn.  To PCP for other health maintenance.      Encounter for gynecological examination without abnormal finding  -     Liquid-Based Pap Smear, Screening  -     HIV 1/2 Ag/Ab (4th Gen); Future; Expected date: 02/07/2023  -     RPR; Future; Expected date: 02/07/2023  -     Hepatitis Panel, Acute; Future; Expected date: 02/07/2023  -     C. trachomatis/N. gonorrhoeae by AMP DNA    HSV (herpes simplex virus) infection  -     acyclovir (ZOVIRAX) 400 MG tablet; Take 1 tablet (400 mg total) by mouth 2 (two) times daily.  Dispense: 60 tablet; Refill: 11    Hot flashes  -     estradioL (ESTRACE) 2 MG tablet; Take 1 tablet (2 mg total) by mouth once daily.  Dispense: 90 tablet; Refill: 3  -     TSH; Future;  Expected date: 02/07/2023  -     Follicle Stimulating Hormone; Future; Expected date: 02/14/2023    History of loop electrical excision procedure (LEEP)  -     Liquid-Based Pap Smear, Screening    Encounter for screening for infections with predominantly sexual mode of transmission  -     HIV 1/2 Ag/Ab (4th Gen); Future; Expected date: 02/07/2023  -     RPR; Future; Expected date: 02/07/2023  -     Hepatitis Panel, Acute; Future; Expected date: 02/07/2023  -     C. trachomatis/N. gonorrhoeae by AMP DNA    Syphilis, congenital  -     RPR; Future; Expected date: 02/07/2023

## 2023-02-08 LAB
HAV IGM SERPL QL IA: NORMAL
HBV CORE IGM SERPL QL IA: NORMAL
HBV SURFACE AG SERPL QL IA: NORMAL
HCV AB SERPL QL IA: NORMAL
RPR SER QL: REACTIVE
RPR SER-TITR: ABNORMAL {TITER}

## 2023-02-09 LAB
C TRACH DNA SPEC QL NAA+PROBE: NOT DETECTED
N GONORRHOEA DNA SPEC QL NAA+PROBE: NOT DETECTED

## 2023-02-11 PROBLEM — Z98.890 HISTORY OF LOOP ELECTRICAL EXCISION PROCEDURE (LEEP): Status: ACTIVE | Noted: 2023-02-11

## 2023-02-11 PROBLEM — N30.00 ACUTE CYSTITIS WITHOUT HEMATURIA: Status: RESOLVED | Noted: 2020-12-11 | Resolved: 2023-02-11

## 2023-02-13 ENCOUNTER — PATIENT MESSAGE (OUTPATIENT)
Dept: OBSTETRICS AND GYNECOLOGY | Facility: CLINIC | Age: 28
End: 2023-02-13
Payer: MEDICAID

## 2023-02-13 LAB — T PALLIDUM AB SER QL IF: ABNORMAL

## 2023-02-14 LAB
FINAL PATHOLOGIC DIAGNOSIS: NORMAL
Lab: NORMAL

## 2023-02-24 ENCOUNTER — TELEPHONE (OUTPATIENT)
Dept: OBSTETRICS AND GYNECOLOGY | Facility: CLINIC | Age: 28
End: 2023-02-24
Payer: MEDICAID

## 2023-02-24 NOTE — TELEPHONE ENCOUNTER
----- Message from Mayra Yanes sent at 2/24/2023 11:15 AM CST -----  Contact: tessy/ [facility]  Tessy with [facility] is calling to speak with the nurse regarding questions and concerns. Reports needing to know on 02/07, was their any signs or new symptoms or infections for the patient . Please give tessy a call back at [phone]  Thanks avery

## 2023-02-27 ENCOUNTER — PATIENT MESSAGE (OUTPATIENT)
Dept: OBSTETRICS AND GYNECOLOGY | Facility: CLINIC | Age: 28
End: 2023-02-27
Payer: MEDICAID

## 2023-02-27 ENCOUNTER — TELEPHONE (OUTPATIENT)
Dept: OBSTETRICS AND GYNECOLOGY | Facility: CLINIC | Age: 28
End: 2023-02-27
Payer: MEDICAID

## 2023-02-27 DIAGNOSIS — B37.31 YEAST VAGINITIS: Primary | ICD-10-CM

## 2023-02-27 RX ORDER — FLUCONAZOLE 150 MG/1
150 TABLET ORAL
Qty: 2 TABLET | Refills: 0 | Status: SHIPPED | OUTPATIENT
Start: 2023-02-27 | End: 2023-03-03

## 2023-02-27 NOTE — TELEPHONE ENCOUNTER
Returned her phone call -- [facility].  Verified pt's  with her.  She saw 1:8 quant and had questions about if she was symptomatic at time of visit.  Pt c/o of fatigue.  Pt's quant  at [facility] 1:4.  Reviewed 2018 and 2019 results.  Pt has congenital syphilis and does not need to be treated since it is a mild change from that  result.  CHANG Rodriguez-BC

## 2023-05-05 ENCOUNTER — TELEPHONE (OUTPATIENT)
Dept: OBSTETRICS AND GYNECOLOGY | Facility: CLINIC | Age: 28
End: 2023-05-05
Payer: MEDICAID

## 2023-05-05 ENCOUNTER — PATIENT MESSAGE (OUTPATIENT)
Dept: OBSTETRICS AND GYNECOLOGY | Facility: CLINIC | Age: 28
End: 2023-05-05
Payer: MEDICAID

## 2023-05-05 NOTE — TELEPHONE ENCOUNTER
----- Message from Julia Arias sent at 5/5/2023  3:40 PM CDT -----  Contact: self [phone]  Patient called back while on her break she may not be able to answer the call. She would like you to leave whatever you need to in her patient portal. Please call back [phone]

## 2023-05-05 NOTE — TELEPHONE ENCOUNTER
----- Message from Shayy Edwards sent at 5/5/2023  2:59 PM CDT -----  Contact: Chasity  Type:  Sooner Apoointment Request    Caller is requesting a sooner appointment.  Caller declined first available appointment listed below.  Caller will not accept being placed on the waitlist and is requesting a message be sent to doctor.  Name of Caller: Chasity  When is the first available appointment? 5/17/23  Symptoms: Vaginal discomfort and STD check   Would the patient rather a call back or a response via [facility]? callback  Best Call Back Number: Please call her at [phone]  Additional Information:

## 2023-07-05 ENCOUNTER — PATIENT MESSAGE (OUTPATIENT)
Dept: RESEARCH | Facility: HOSPITAL | Age: 28
End: 2023-07-05
Payer: MEDICAID

## 2024-02-09 DIAGNOSIS — B00.9 HSV (HERPES SIMPLEX VIRUS) INFECTION: ICD-10-CM

## 2024-02-09 RX ORDER — ACYCLOVIR 400 MG/1
400 TABLET ORAL 2 TIMES DAILY
Qty: 60 TABLET | Refills: 0 | Status: SHIPPED | OUTPATIENT
Start: 2024-02-09 | End: 2024-03-12

## 2024-03-11 DIAGNOSIS — B00.9 HSV (HERPES SIMPLEX VIRUS) INFECTION: ICD-10-CM

## 2024-03-12 RX ORDER — ACYCLOVIR 400 MG/1
400 TABLET ORAL 2 TIMES DAILY
Qty: 60 TABLET | Refills: 0 | Status: SHIPPED | OUTPATIENT
Start: 2024-03-12

## 2024-04-15 ENCOUNTER — TELEPHONE (OUTPATIENT)
Dept: OBSTETRICS AND GYNECOLOGY | Facility: CLINIC | Age: 29
End: 2024-04-15
Payer: MEDICAID

## 2024-04-15 NOTE — TELEPHONE ENCOUNTER
----- Message from Devora Janaemichel sent at 4/15/2024  7:56 AM CDT -----  Contact: self  Pt states she is stuck in traffic and it is showing her not getting to her appt until 8:18 AM and her apt is for 8 AM.  States she really needs to be seen so is there a longer delroy period or do you have a little later appt today.  Please call back at [phone] and thanks

## 2024-04-15 NOTE — TELEPHONE ENCOUNTER
Returned pt call confirmed pt identifiers informed pt that per provider she will need to r/s for next available informed pt of next available pt declined and stated she will not be able to request off from work until 4-6 days informed her that she can check back then for openings pt voiced understanding.

## 2024-05-25 ENCOUNTER — HOSPITAL ENCOUNTER (EMERGENCY)
Facility: HOSPITAL | Age: 29
Discharge: HOME | End: 2024-05-25
Attending: EMERGENCY MEDICINE
Payer: MEDICAID

## 2024-05-25 VITALS
OXYGEN SATURATION: 100 % | BODY MASS INDEX: 31.18 KG/M2 | SYSTOLIC BLOOD PRESSURE: 139 MMHG | TEMPERATURE: 99 F | RESPIRATION RATE: 18 BRPM | WEIGHT: 187.38 LBS | DIASTOLIC BLOOD PRESSURE: 92 MMHG | HEART RATE: 82 BPM

## 2024-05-25 DIAGNOSIS — N39.0 URINARY TRACT INFECTION WITH HEMATURIA, SITE UNSPECIFIED: Primary | ICD-10-CM

## 2024-05-25 DIAGNOSIS — R31.9 URINARY TRACT INFECTION WITH HEMATURIA, SITE UNSPECIFIED: Primary | ICD-10-CM

## 2024-05-25 LAB
ALBUMIN SERPL BCP-MCNC: 4 G/DL (ref 3.5–5.2)
ALP SERPL-CCNC: 81 U/L (ref 55–135)
ALT SERPL W/O P-5'-P-CCNC: 24 U/L (ref 10–44)
ANION GAP SERPL CALC-SCNC: 12 MMOL/L (ref 8–16)
AST SERPL-CCNC: 19 U/L (ref 10–40)
B-HCG UR QL: NEGATIVE
BACTERIA #/AREA URNS HPF: ABNORMAL /HPF
BASOPHILS # BLD AUTO: 0.02 K/UL (ref 0–0.2)
BASOPHILS NFR BLD: 0.2 % (ref 0–1.9)
BILIRUB SERPL-MCNC: 0.9 MG/DL (ref 0.1–1)
BILIRUB UR QL STRIP: NEGATIVE
BUN SERPL-MCNC: 9 MG/DL (ref 6–20)
CALCIUM SERPL-MCNC: 10.2 MG/DL (ref 8.7–10.5)
CHLORIDE SERPL-SCNC: 107 MMOL/L (ref 95–110)
CLARITY UR: ABNORMAL
CO2 SERPL-SCNC: 19 MMOL/L (ref 23–29)
COLOR UR: YELLOW
CREAT SERPL-MCNC: 0.8 MG/DL (ref 0.5–1.4)
DIFFERENTIAL METHOD BLD: ABNORMAL
EOSINOPHIL # BLD AUTO: 0 K/UL (ref 0–0.5)
EOSINOPHIL NFR BLD: 0.5 % (ref 0–8)
ERYTHROCYTE [DISTWIDTH] IN BLOOD BY AUTOMATED COUNT: 12.7 % (ref 11.5–14.5)
EST. GFR  (NO RACE VARIABLE): >60 ML/MIN/1.73 M^2
GLUCOSE SERPL-MCNC: 104 MG/DL (ref 70–110)
GLUCOSE UR QL STRIP: NEGATIVE
HCT VFR BLD AUTO: 40.9 % (ref 37–48.5)
HCV AB SERPL QL IA: NEGATIVE
HEP C VIRUS HOLD SPECIMEN: NORMAL
HGB BLD-MCNC: 13.3 G/DL (ref 12–16)
HGB UR QL STRIP: ABNORMAL
HIV 1+2 AB+HIV1 P24 AG SERPL QL IA: NEGATIVE
IMM GRANULOCYTES # BLD AUTO: 0.02 K/UL (ref 0–0.04)
IMM GRANULOCYTES NFR BLD AUTO: 0.2 % (ref 0–0.5)
KETONES UR QL STRIP: NEGATIVE
LEUKOCYTE ESTERASE UR QL STRIP: ABNORMAL
LYMPHOCYTES # BLD AUTO: 2.3 K/UL (ref 1–4.8)
LYMPHOCYTES NFR BLD: 26.9 % (ref 18–48)
MCH RBC QN AUTO: 26.2 PG (ref 27–31)
MCHC RBC AUTO-ENTMCNC: 32.5 G/DL (ref 32–36)
MCV RBC AUTO: 81 FL (ref 82–98)
MICROSCOPIC COMMENT: ABNORMAL
MONOCYTES # BLD AUTO: 0.8 K/UL (ref 0.3–1)
MONOCYTES NFR BLD: 9.4 % (ref 4–15)
NEUTROPHILS # BLD AUTO: 5.5 K/UL (ref 1.8–7.7)
NEUTROPHILS NFR BLD: 62.8 % (ref 38–73)
NITRITE UR QL STRIP: NEGATIVE
NRBC BLD-RTO: 0 /100 WBC
PH UR STRIP: 7 [PH] (ref 5–8)
PLATELET # BLD AUTO: 181 K/UL (ref 150–450)
PMV BLD AUTO: 12 FL (ref 9.2–12.9)
POTASSIUM SERPL-SCNC: 4.3 MMOL/L (ref 3.5–5.1)
PROT SERPL-MCNC: 8.1 G/DL (ref 6–8.4)
PROT UR QL STRIP: ABNORMAL
RBC # BLD AUTO: 5.08 M/UL (ref 4–5.4)
RBC #/AREA URNS HPF: 17 /HPF (ref 0–4)
SODIUM SERPL-SCNC: 138 MMOL/L (ref 136–145)
SP GR UR STRIP: 1.02 (ref 1–1.03)
URN SPEC COLLECT METH UR: ABNORMAL
UROBILINOGEN UR STRIP-ACNC: NEGATIVE EU/DL
WBC # BLD AUTO: 8.7 K/UL (ref 3.9–12.7)
WBC #/AREA URNS HPF: 72 /HPF (ref 0–5)
WBC CLUMPS URNS QL MICRO: ABNORMAL

## 2024-05-25 PROCEDURE — 99284 EMERGENCY DEPT VISIT MOD MDM: CPT | Mod: 25

## 2024-05-25 PROCEDURE — 86803 HEPATITIS C AB TEST: CPT | Performed by: EMERGENCY MEDICINE

## 2024-05-25 PROCEDURE — 85025 COMPLETE CBC W/AUTO DIFF WBC: CPT | Performed by: NURSE PRACTITIONER

## 2024-05-25 PROCEDURE — 80053 COMPREHEN METABOLIC PANEL: CPT | Performed by: NURSE PRACTITIONER

## 2024-05-25 PROCEDURE — 81000 URINALYSIS NONAUTO W/SCOPE: CPT | Performed by: NURSE PRACTITIONER

## 2024-05-25 PROCEDURE — 63600175 PHARM REV CODE 636 W HCPCS: Performed by: NURSE PRACTITIONER

## 2024-05-25 PROCEDURE — 87088 URINE BACTERIA CULTURE: CPT | Performed by: NURSE PRACTITIONER

## 2024-05-25 PROCEDURE — 81025 URINE PREGNANCY TEST: CPT | Performed by: NURSE PRACTITIONER

## 2024-05-25 PROCEDURE — 96365 THER/PROPH/DIAG IV INF INIT: CPT

## 2024-05-25 PROCEDURE — 96361 HYDRATE IV INFUSION ADD-ON: CPT

## 2024-05-25 PROCEDURE — 87186 SC STD MICRODIL/AGAR DIL: CPT | Performed by: NURSE PRACTITIONER

## 2024-05-25 PROCEDURE — 87086 URINE CULTURE/COLONY COUNT: CPT | Performed by: NURSE PRACTITIONER

## 2024-05-25 PROCEDURE — 87077 CULTURE AEROBIC IDENTIFY: CPT | Performed by: NURSE PRACTITIONER

## 2024-05-25 PROCEDURE — 87389 HIV-1 AG W/HIV-1&-2 AB AG IA: CPT | Performed by: EMERGENCY MEDICINE

## 2024-05-25 PROCEDURE — 25000003 PHARM REV CODE 250: Performed by: NURSE PRACTITIONER

## 2024-05-25 RX ORDER — PHENAZOPYRIDINE HYDROCHLORIDE 200 MG/1
200 TABLET, FILM COATED ORAL 3 TIMES DAILY
Qty: 6 TABLET | Refills: 0 | Status: SHIPPED | OUTPATIENT
Start: 2024-05-25 | End: 2024-06-04

## 2024-05-25 RX ORDER — FLUCONAZOLE 150 MG/1
150 TABLET ORAL DAILY
Qty: 2 TABLET | Refills: 0 | Status: SHIPPED | OUTPATIENT
Start: 2024-05-25 | End: 2024-05-27

## 2024-05-25 RX ORDER — CEPHALEXIN 500 MG/1
500 CAPSULE ORAL 2 TIMES DAILY
Qty: 14 CAPSULE | Refills: 0 | Status: SHIPPED | OUTPATIENT
Start: 2024-05-25 | End: 2024-06-01

## 2024-05-25 RX ADMIN — CEFTRIAXONE 1 G: 1 INJECTION, POWDER, FOR SOLUTION INTRAMUSCULAR; INTRAVENOUS at 01:05

## 2024-05-25 RX ADMIN — SODIUM CHLORIDE 1000 ML: 9 INJECTION, SOLUTION INTRAVENOUS at 12:05

## 2024-05-26 NOTE — ED PROVIDER NOTES
Encounter Date: 2024       History     Chief Complaint   Patient presents with    Dysuria     Pt thought she had a UTI and took OTC meds x 2 weeks, states now + back and abdominal pain, chills, pain with urination and frequent urination.     The history is provided by the patient. No  was used.   Dysuria   This is a new problem. The current episode started several weeks ago. The problem occurs every urination. The problem has been unchanged. The quality of the pain is described as burning. The pain is at a severity of 4/10. Associated symptoms include frequency and urgency. Pertinent negatives include no chills, no sweats, no nausea, no vomiting, no discharge, no hematuria, no hesitancy and no flank pain. She has tried home medications for the symptoms.     Review of patient's allergies indicates:   Allergen Reactions    Fish containing products Anaphylaxis    Sudafed [pseudoephedrine hcl] Other (See Comments)     Tight knot on her neck     Past Medical History:   Diagnosis Date    Abnormal Pap smear of cervix     Anemia     Arthritis     pau arms/legs    GERD (gastroesophageal reflux disease)     Herpes simplex virus (HSV) infection     Pelvic pain 2019    Syphilis, congenital      Past Surgical History:   Procedure Laterality Date     SECTION  2013    HYSTERECTOMY      LOOP ELECTROSURGICAL EXCISION PROCEDURE (LEEP)  2019    hsil on colpo    ROBOT-ASSISTED LAPAROSCOPIC ABDOMINAL HYSTERECTOMY USING DA SULEMA XI N/A 2019    Procedure: XI ROBOTIC HYSTERECTOMY;  Surgeon: Norma Chen MD;  Location: [facility] OR;  Service: OB/GYN;  Laterality: N/A;    ROBOT-ASSISTED SURGICAL REMOVAL OF FALLOPIAN TUBE USING DA SULEMA XI Bilateral 2019    Procedure: XI ROBOTIC SALPINGECTOMY;  Surgeon: Norma Chen MD;  Location: [facility] OR;  Service: OB/GYN;  Laterality: Bilateral;     Family History   Problem Relation Name Age of Onset    Hypertension Mother      Stroke Mother       Heart attack Mother      Cancer Mother          bile duct cancer    Heart murmur Father       Social History     Tobacco Use    Smoking status: Never    Smokeless tobacco: Never   Substance Use Topics    Alcohol use: No    Drug use: No     Review of Systems   Constitutional:  Negative for chills and fever.   HENT:  Negative for sore throat.    Respiratory:  Negative for shortness of breath.    Cardiovascular:  Negative for chest pain.   Gastrointestinal:  Positive for abdominal pain. Negative for nausea and vomiting.   Genitourinary:  Positive for dysuria, frequency and urgency. Negative for flank pain, hematuria and hesitancy.   Musculoskeletal:  Negative for back pain.   Skin:  Negative for rash.   Neurological:  Negative for weakness.   Hematological:  Does not bruise/bleed easily.       Physical Exam     Initial Vitals [05/25/24 1125]   BP Pulse Resp Temp SpO2   (!) 147/71 96 16 98.7 °F (37.1 °C) 100 %      MAP       --         Physical Exam    Nursing note and vitals reviewed.  Constitutional: She appears well-developed and well-nourished. She is not diaphoretic. No distress.   HENT:   Head: Normocephalic and atraumatic.   Eyes: Right eye exhibits no discharge. Left eye exhibits no discharge.   Neck: Neck supple.   Normal range of motion.  Cardiovascular:  Normal rate.           Pulmonary/Chest: No respiratory distress.   Abdominal: Abdomen is soft. She exhibits no distension. There is no abdominal tenderness.     No CVA tenderness   Musculoskeletal:         General: Normal range of motion.      Cervical back: Normal range of motion and neck supple.     Neurological: She is alert and oriented to person, place, and time. She has normal strength.   Skin: Skin is warm and dry.   Psychiatric: She has a normal mood and affect. Her behavior is normal. Thought content normal.         ED Course   Procedures  Labs Reviewed   CBC W/ AUTO DIFFERENTIAL - Abnormal; Notable for the following components:       Result Value     MCV 81 (*)     MCH 26.2 (*)     All other components within normal limits    Narrative:     Release to patient->Immediate   COMPREHENSIVE METABOLIC PANEL - Abnormal; Notable for the following components:    CO2 19 (*)     All other components within normal limits    Narrative:     Release to patient->Immediate   URINALYSIS, REFLEX TO URINE CULTURE - Abnormal; Notable for the following components:    Appearance, UA Hazy (*)     Protein, UA Trace (*)     Occult Blood UA 1+ (*)     Leukocytes, UA 3+ (*)     All other components within normal limits    Narrative:     Specimen Source->Urine   URINALYSIS MICROSCOPIC - Abnormal; Notable for the following components:    RBC, UA 17 (*)     WBC, UA 72 (*)     WBC Clumps, UA Many (*)     Bacteria Many (*)     All other components within normal limits    Narrative:     Specimen Source->Urine   CULTURE, URINE   HIV 1 / 2 ANTIBODY    Narrative:     Release to patient->Immediate   HEPATITIS C ANTIBODY    Narrative:     Release to patient->Immediate   HEP C VIRUS HOLD SPECIMEN    Narrative:     Release to patient->Immediate   PREGNANCY TEST, URINE RAPID    Narrative:     Specimen Source->Urine          Imaging Results    None          Medications   sodium chloride 0.9% bolus 1,000 mL 1,000 mL (0 mLs Intravenous Stopped 5/25/24 1514)   cefTRIAXone (Rocephin) 1 g in dextrose 5 % in water (D5W) 100 mL IVPB (MB+) (0 g Intravenous Stopped 5/25/24 1500)     Medical Decision Making   Differential diagnosis   Cystitis, UTI, pyelonephritis    Amount and/or Complexity of Data Reviewed  Labs: ordered.  Discussion of management or test interpretation with external provider(s):  Patient to follow up with PCP of choice as needed and return to the ER for any worsening or concerns.  Patient reports normally when she takes antibiotics she can get a yeast infection easily and request Diflucan.    Risk  Prescription drug management.                                      Clinical Impression:  Final  diagnoses:  [N39.0, R31.9] Urinary tract infection with hematuria, site unspecified (Primary)          ED Disposition Condition    Discharge Stable          ED Prescriptions       Medication Sig Dispense Start Date End Date Auth. Provider    cephALEXin (KEFLEX) 500 MG capsule Take 1 capsule (500 mg total) by mouth 2 (two) times a day. for 7 days 14 capsule 5/25/2024 6/1/2024 Maxim Milan, NP    phenazopyridine (PYRIDIUM) 200 MG tablet Take 1 tablet (200 mg total) by mouth 3 (three) times daily. for 10 days 6 tablet 5/25/2024 6/4/2024 Maxim Milan, NP    fluconazole (DIFLUCAN) 150 MG Tab Take 1 tablet (150 mg total) by mouth once daily. for 2 days 2 tablet 5/25/2024 5/27/2024 Maxim Milan NP          Follow-up Information       Follow up With Specialties Details Why Contact Info    pcp of choice   As needed     [facility] - Emergency Dept. Emergency Medicine  If symptoms worsen [id number] [facility] [id number] [phone]             Maxim Milan NP  05/25/24 1935       Maxim Milan NP  05/25/24 1936

## 2024-05-27 LAB — BACTERIA UR CULT: ABNORMAL

## 2024-08-30 NOTE — TELEPHONE ENCOUNTER
Contacted pt to schedule appt.  Informed pt we do not accept medicaid.  Referred pt to other clinics that do accept medicaid.   HPI   Chief Complaint   Patient presents with    Asthma       Patient is a 24-year-old male with a past medical history significant for asthma presenting to the ED with an asthma exacerbation.  Patient states he ran out of his home medications and started experiencing shortness of breath and wheezing yesterday evening.  He also endorses some associated nasal congestion and a dry cough.  He denies other fevers or flulike symptoms.  Patient states he received a breathing treatment en route to the ED which did improve his symptoms.  He further denies any fevers, chills, flulike symptoms, chest pain, or nausea/vomiting.              Patient History   History reviewed. No pertinent past medical history.  History reviewed. No pertinent surgical history.  No family history on file.  Social History     Tobacco Use    Smoking status: Not on file    Smokeless tobacco: Not on file   Substance Use Topics    Alcohol use: Not on file    Drug use: Not on file       Physical Exam   ED Triage Vitals [08/30/24 0054]   Temperature Heart Rate Respirations BP   36 °C (96.8 °F) 100 18 122/61      Pulse Ox Temp Source Heart Rate Source Patient Position   96 % Temporal Monitor Sitting      BP Location FiO2 (%)     Left arm --       Physical Exam  Vitals reviewed.   Constitutional:       General: He is not in acute distress.     Appearance: He is not ill-appearing.   Cardiovascular:      Rate and Rhythm: Normal rate and regular rhythm.   Pulmonary:      Effort: Pulmonary effort is normal. No respiratory distress.      Breath sounds: Wheezing (diffuse) present.      Comments: Dry cough present.  No accessory muscle use.  Speaking clearly in full sentences.  Neurological:      General: No focal deficit present.      Mental Status: He is alert.           ED Course & MDM   Diagnoses as of 08/30/24 0225   Exacerbation of asthma, unspecified asthma severity, unspecified whether persistent ([facility])                 No data recorded     [place]  Coma Scale Score: 15 (08/30/24 0058 : Terra Cowan RN)                           Medical Decision Making  Patient is a 24-year-old male with a past medical history significant for asthma presenting to the ED with an asthma exacerbation.  History was obtained the patient.  Ran out of his asthma supplies and began experiencing some shortness of breath and wheezing yesterday.  Also has slight nasal congestion and a dry cough.  Denies other fevers, flulike symptoms, or chest pain.  Received a breathing treatment and route via EMS for which he feels improved.  On physical exam, patient is sitting comfortably and in no apparent distress.  A dry cough is present.  Pulmonary effort is normal without distress.  No accessory muscle use.  He is speaking clearly in full sentences.  Diffuse wheezing is auscultated throughout all lung fields.  Remainder of exam as noted above.  Patient is afebrile, heart rate 100, respirations 18, and pulse ox 96%.      Patient's workup consistent with asthma exacerbation.  Low suspicion for underlying infectious etiology and therefore no indication for viral swabs or chest x-ray at this time.  He will be treated with DuoNebs as well as prednisone.  Upon reassessment, patient reports overall symptomatic improvement.  He states he feels well enough to be discharged at this time.  Repeat physical exam reveals improved, but continued wheezing throughout all lung fields.  Patient continues to state he feels well enough to leave.  He is seen sleeping comfortably throughout his stay here in the ED.  Therefore, I refilled his albuterol inhaler as well as written prescription for prednisone.  Patient was educated on proper use of each of these.  Return precautions discussed.  He is agreeable to the plan and all of his questions were answered to satisfaction.  He was discharged from the ED in stable condition.        Procedure  Procedures     Ariana Art PA-C  08/30/24 0229

## 2024-11-05 ENCOUNTER — PATIENT MESSAGE (OUTPATIENT)
Dept: RESEARCH | Facility: HOSPITAL | Age: 29
End: 2024-11-05
Payer: MEDICAID

## 2024-12-13 ENCOUNTER — OFFICE VISIT (OUTPATIENT)
Dept: OBSTETRICS AND GYNECOLOGY | Facility: CLINIC | Age: 29
End: 2024-12-13
Payer: MEDICAID

## 2024-12-13 ENCOUNTER — LAB VISIT (OUTPATIENT)
Dept: LAB | Facility: HOSPITAL | Age: 29
End: 2024-12-13
Attending: NURSE PRACTITIONER
Payer: MEDICAID

## 2024-12-13 VITALS
WEIGHT: 183.44 LBS | DIASTOLIC BLOOD PRESSURE: 80 MMHG | HEIGHT: 65 IN | SYSTOLIC BLOOD PRESSURE: 130 MMHG | BODY MASS INDEX: 30.56 KG/M2

## 2024-12-13 DIAGNOSIS — Z11.3 SCREENING EXAMINATION FOR STD (SEXUALLY TRANSMITTED DISEASE): ICD-10-CM

## 2024-12-13 DIAGNOSIS — N89.8 VAGINAL ODOR: ICD-10-CM

## 2024-12-13 DIAGNOSIS — N95.1 VAGINAL DRYNESS, MENOPAUSAL: ICD-10-CM

## 2024-12-13 DIAGNOSIS — Z01.419 ROUTINE GYNECOLOGICAL EXAMINATION: Primary | ICD-10-CM

## 2024-12-13 DIAGNOSIS — A50.9 SYPHILIS, CONGENITAL: ICD-10-CM

## 2024-12-13 PROCEDURE — 87491 CHLMYD TRACH DNA AMP PROBE: CPT | Performed by: NURSE PRACTITIONER

## 2024-12-13 PROCEDURE — 86592 SYPHILIS TEST NON-TREP QUAL: CPT | Performed by: NURSE PRACTITIONER

## 2024-12-13 PROCEDURE — 36415 COLL VENOUS BLD VENIPUNCTURE: CPT | Performed by: NURSE PRACTITIONER

## 2024-12-13 PROCEDURE — 87389 HIV-1 AG W/HIV-1&-2 AB AG IA: CPT | Performed by: NURSE PRACTITIONER

## 2024-12-13 PROCEDURE — 0352U VAGINOSIS SCREEN BY DNA PROBE: CPT | Performed by: NURSE PRACTITIONER

## 2024-12-13 PROCEDURE — 99213 OFFICE O/P EST LOW 20 MIN: CPT | Mod: PBBFAC | Performed by: NURSE PRACTITIONER

## 2024-12-13 PROCEDURE — 86593 SYPHILIS TEST NON-TREP QUANT: CPT | Performed by: NURSE PRACTITIONER

## 2024-12-13 PROCEDURE — 99999 PR PBB SHADOW E&M-EST. PATIENT-LVL III: CPT | Mod: PBBFAC,,, | Performed by: NURSE PRACTITIONER

## 2024-12-13 PROCEDURE — 86593 SYPHILIS TEST NON-TREP QUANT: CPT | Mod: 91 | Performed by: NURSE PRACTITIONER

## 2024-12-13 PROCEDURE — 80074 ACUTE HEPATITIS PANEL: CPT | Performed by: NURSE PRACTITIONER

## 2024-12-13 RX ORDER — ESTRADIOL 10 UG/1
INSERT VAGINAL
Qty: 30 TABLET | Refills: 1 | Status: SHIPPED | OUTPATIENT
Start: 2024-12-13

## 2024-12-13 RX ORDER — METRONIDAZOLE 500 MG/1
500 TABLET ORAL EVERY 12 HOURS
Qty: 14 TABLET | Refills: 0 | Status: SHIPPED | OUTPATIENT
Start: 2024-12-13 | End: 2024-12-20

## 2024-12-13 NOTE — PROGRESS NOTES
"  Subjective:       Patient ID: Madelyn Aguilar is a 29 y.o. female.    Chief Complaint:  STD CHECK      History of Present Illness  HPI  Hysterectomy re:  non cancerous   Vaginal odor (fishy) over the past month  Health Maintenance   Topic Date Due    Lipid Panel  Never done    COVID-19 Vaccine (3 - - season) 2024    TETANUS VACCINE  2031    RSV Vaccine (Age 60+ and Pregnant patients) (1 - 1-dose 75+ series) 2068    Hepatitis C Screening  Completed    Influenza Vaccine  Completed    HIV Screening  Completed    Pneumococcal Vaccines (Age 0-64)  Aged Out     GYN & OB History  Patient's last menstrual period was 2019 (exact date).   Date of Last Pap: 2023 negative hpv negative     OB History    Para Term  AB Living   3 1 1 0 2 1   SAB IAB Ectopic Multiple Live Births   2 0 0 0 1      # Outcome Date GA Lbr Dusty/2nd Weight Sex Type Anes PTL Lv   3 Term 13    F CS-Unspec   JAYCOB      Complications: Failure to Progress in First Stage   2 SAB            1 SAB                Review of Systems  Review of Systems        Objective:   /80   Ht 5' 5" (1.651 m)   Wt 83.2 kg (183 lb 6.8 oz)   LMP 2019 (Exact Date)   BMI 30.52 kg/m²    Physical Exam     Assessment:        1. Routine gynecological examination    2. Screening examination for STD (sexually transmitted disease)    3. Vaginal odor    4. Syphilis, congenital    5. Vaginal dryness, menopausal                Plan:            Madelyn was seen today for std check.    Diagnoses and all orders for this visit:    Routine gynecological examination    Screening examination for STD (sexually transmitted disease)  -     C. trachomatis/N. gonorrhoeae by AMP DNA [facility]; Vagina; Future  -     HIV 1/2 Ag/Ab (4th Gen); Future  -     Treponema Pallidium Antibodies IgG, IgM; Future  -     Hepatitis Panel, Acute; Future    Vaginal odor  -     Vaginosis Screen by DNA Probe; Future  -     metroNIDAZOLE (FLAGYL) " 500 MG tablet; Take 1 tablet (500 mg total) by mouth every 12 (twelve) hours. for 7 days    Syphilis, congenital  -     Ambulatory referral/consult to Infectious Disease; Future    Vaginal dryness, menopausal  -     estradioL (VAGIFEM) 10 mcg Tab; Insert one tablet vaginally times 2 weeks then twice a week    Return to clinic in one year for WWE

## 2024-12-14 LAB
HAV IGM SERPL QL IA: NORMAL
HBV CORE IGM SERPL QL IA: NORMAL
HBV SURFACE AG SERPL QL IA: NORMAL
HCV AB SERPL QL IA: NORMAL
HIV 1+2 AB+HIV1 P24 AG SERPL QL IA: NORMAL
RPR SER QL: REACTIVE
RPR SER-TITR: ABNORMAL {TITER}
TREPONEMA PALLIDUM IGG+IGM AB [PRESENCE] IN SERUM OR PLASMA BY IMMUNOASSAY: REACTIVE

## 2024-12-19 DIAGNOSIS — N95.1 VAGINAL DRYNESS, MENOPAUSAL: Primary | ICD-10-CM

## 2025-02-18 ENCOUNTER — OFFICE VISIT (OUTPATIENT)
Dept: INFECTIOUS DISEASES | Facility: CLINIC | Age: 30
End: 2025-02-18
Payer: MEDICAID

## 2025-02-18 ENCOUNTER — PATIENT MESSAGE (OUTPATIENT)
Dept: INFECTIOUS DISEASES | Facility: CLINIC | Age: 30
End: 2025-02-18

## 2025-02-18 VITALS
SYSTOLIC BLOOD PRESSURE: 145 MMHG | DIASTOLIC BLOOD PRESSURE: 89 MMHG | OXYGEN SATURATION: 100 % | HEART RATE: 89 BPM | HEIGHT: 65 IN | TEMPERATURE: 98 F | BODY MASS INDEX: 30.56 KG/M2 | WEIGHT: 183.44 LBS

## 2025-02-18 DIAGNOSIS — A50.9 SYPHILIS, CONGENITAL: ICD-10-CM

## 2025-02-18 PROCEDURE — 99213 OFFICE O/P EST LOW 20 MIN: CPT | Mod: PBBFAC | Performed by: INTERNAL MEDICINE

## 2025-02-18 NOTE — H&P (VIEW-ONLY)
Subjective:       Patient ID: Madelyn Aguilar is a 29 y.o. female.    Chief Complaint: Referral for syphilis      HPI 29 year old woman with PMH as listed above  She was born with congenital syphilis.   2024   RPR Quantitative 1:8 !  1:4 !         1.-  Component  Ref Range & Units (hover) 5 yr ago   RPR Quantitative 1:8 Abnormal      She got multiple treatments   Past Medical History:   Diagnosis Date    Abnormal Pap smear of cervix     Anemia     Arthritis     pau arms/legs    GERD (gastroesophageal reflux disease)     Herpes simplex virus (HSV) infection     Pelvic pain 2019    Syphilis, congenital      Past Surgical History:   Procedure Laterality Date     SECTION  2013    HYSTERECTOMY      LOOP ELECTROSURGICAL EXCISION PROCEDURE (LEEP)  2019    hsil on colpo    ROBOT-ASSISTED LAPAROSCOPIC ABDOMINAL HYSTERECTOMY USING DA SULEMA XI N/A 2019    Procedure: XI ROBOTIC HYSTERECTOMY;  Surgeon: Norma Chen MD;  Location: [facility] OR;  Service: OB/GYN;  Laterality: N/A;    ROBOT-ASSISTED SURGICAL REMOVAL OF FALLOPIAN TUBE USING DA SULEMA XI Bilateral 2019    Procedure: XI ROBOTIC SALPINGECTOMY;  Surgeon: Norma Chen MD;  Location: [facility] OR;  Service: OB/GYN;  Laterality: Bilateral;     Family History   Problem Relation Name Age of Onset    Hypertension Mother      Stroke Mother      Heart attack Mother      Cancer Mother          bile duct cancer    Heart murmur Father       Social History[1]  Review of Systems   Constitutional:  Negative for activity change, appetite change, chills, diaphoresis, fatigue and fever.   Respiratory:  Negative for apnea and chest tightness.    Musculoskeletal:  Negative for arthralgias and back pain.   Neurological:  Negative for dizziness and facial asymmetry.   Psychiatric/Behavioral:  Negative for agitation.        Objective:      Physical Exam  Vitals and nursing note reviewed.   Cardiovascular:      Rate and Rhythm: Normal  rate.   Pulmonary:      Effort: Pulmonary effort is normal.   Musculoskeletal:         General: Normal range of motion.      Cervical back: Normal range of motion.   Skin:     General: Skin is warm.   Neurological:      General: No focal deficit present.      Mental Status: She is alert.         Assessment:      1. Syphilis, congenital  Ambulatory referral/consult to Infectious Disease              Plan:       1. Syphilis, congenital  Assessment & Plan:  I discussed her care with the health department-her highest titer-was    Rpr- 11/- 1:16-   3 weeks of bicillin  10/28/2010- 1:4  11/3/2011- 1:8  04/- 1:8  02/05/2018-1.8  07/09/2019- 1;2  11/19/2019- 1;8-NO TREATMENT NOTED   03/- 1;4-   Treated in 2020 -3 doses     2/7/2023 12/13/2024   RPR Quantitative 1:8 !  1:4 !       At this time- she is in a serofast state.  She denies ocular symptoms , auditory issues .   She will need LP to rule out CNS infection if she develops any CNS symptoms   Will repeat RPR in 6 months- no need to treat at this time  We had an extensive discussion about the natural history of syphilis     She denies any prior history of hallucination or suicidal attempts contrary to previous records       Orders:  -     Ambulatory referral/consult to Infectious Disease  -     RPR (for monitoring); Future; Expected date: 08/18/2025                 [1]   Social History  Socioeconomic History    Marital status: Single   Tobacco Use    Smoking status: Never    Smokeless tobacco: Never   Substance and Sexual Activity    Alcohol use: No    Drug use: No    Sexual activity: Yes     Partners: Male     Birth control/protection: Surgical     Comment: [facility]     Social Drivers of Health     Financial Resource Strain: Low Risk  (1/24/2023)    Received from [facility]    Overall Financial Resource Strain (CARDIA)     Difficulty of Paying Living Expenses: Not hard at all    Housing Stability: Unknown (1/24/2023)    Received from [facility]sidi[facility] and [facility]    Housing Stability Vital Sign     Unable to Pay for Housing in the Last Year: No

## 2025-02-18 NOTE — ASSESSMENT & PLAN NOTE
I discussed her care with the health department-her highest titer-was    Rpr- 11/- 1:16-   3 weeks of bicillin  10/28/2010- 1:4  11/3/2011- 1:8  04/- 1:8  02/05/2018-1.8  07/09/2019- 1;2  11/19/2019- 1;8-NO TREATMENT NOTED   03/- 1;4-   Treated in 2020 -3 doses     2/7/2023 12/13/2024   RPR Quantitative 1:8 !  1:4 !       At this time- she is in a serofast state.  She denies ocular symptoms , auditory issues .   She will need LP to rule out CNS infection if she develops any CNS symptoms   Will repeat RPR in 6 months- no need to treat at this time  We had an extensive discussion about the natural history of syphilis     She denies any prior history of hallucination or suicidal attempts contrary to previous records

## 2025-02-19 DIAGNOSIS — A50.9 SYPHILIS, CONGENITAL: Primary | ICD-10-CM

## 2025-02-26 ENCOUNTER — HOSPITAL ENCOUNTER (OUTPATIENT)
Dept: RADIOLOGY | Facility: HOSPITAL | Age: 30
Discharge: HOME | End: 2025-02-26
Attending: INTERNAL MEDICINE
Payer: MEDICAID

## 2025-02-26 DIAGNOSIS — A50.9 SYPHILIS, CONGENITAL: ICD-10-CM

## 2025-02-26 LAB
CLARITY CSF: CLEAR
COLOR CSF: COLORLESS
CSF TUBE NUMBER: 3
PROT CSF-MCNC: 20 MG/DL (ref 15–40)
RBC # CSF: 0 /CU MM
SPECIMEN VOL CSF: 2 ML
WBC # CSF: 1 /CU MM (ref 0–5)

## 2025-02-26 PROCEDURE — 62328 DX LMBR SPI PNXR W/FLUOR/CT: CPT | Mod: ,,, | Performed by: RADIOLOGY

## 2025-02-26 PROCEDURE — 84157 ASSAY OF PROTEIN OTHER: CPT | Performed by: INTERNAL MEDICINE

## 2025-02-26 PROCEDURE — 62328 DX LMBR SPI PNXR W/FLUOR/CT: CPT

## 2025-02-26 PROCEDURE — 89051 BODY FLUID CELL COUNT: CPT | Performed by: INTERNAL MEDICINE

## 2025-02-26 PROCEDURE — 86592 SYPHILIS TEST NON-TREP QUAL: CPT | Performed by: INTERNAL MEDICINE

## 2025-02-26 NOTE — DISCHARGE SUMMARY
Radiology Post-Procedure Note    Pre Op Diagnosis: Syphilis    Post Op Diagnosis: Same    Procedure: lumbar puncture    Written Informed Consent Obtained: Yes    Specimen Removed: 9 mL CSF    Estimated Blood Loss: Minimal    Findings: Following written informed consent and sterile prep and drape, a 22 gauge spinal needle was inserted at L3 intralaminar space under fluoroscopic surveillance.  9 mL clear CSF removed and sent to the lab for further analysis.  There were no complications.    Patient tolerated procedure well.

## 2025-02-26 NOTE — H&P
Bed rest x 30 mins. D/c to home, pt not to drive today or walk up stairs to decrease the risk of post LP h/a.  Remove band aid in lower back tomorrow. No sit baths next couple of days to decrease risk of infection, showers are permissible.

## 2025-02-28 ENCOUNTER — PATIENT MESSAGE (OUTPATIENT)
Dept: INFECTIOUS DISEASES | Facility: CLINIC | Age: 30
End: 2025-02-28
Payer: MEDICAID

## 2025-02-28 ENCOUNTER — RESULTS FOLLOW-UP (OUTPATIENT)
Dept: INFECTIOUS DISEASES | Facility: HOSPITAL | Age: 30
End: 2025-02-28
Payer: MEDICAID

## 2025-02-28 ENCOUNTER — ANESTHESIA (OUTPATIENT)
Dept: EMERGENCY MEDICINE | Facility: HOSPITAL | Age: 30
End: 2025-02-28
Payer: MEDICAID

## 2025-02-28 ENCOUNTER — HOSPITAL ENCOUNTER (EMERGENCY)
Facility: HOSPITAL | Age: 30
Discharge: HOME | End: 2025-02-28
Attending: EMERGENCY MEDICINE
Payer: MEDICAID

## 2025-02-28 ENCOUNTER — ANESTHESIA EVENT (OUTPATIENT)
Dept: EMERGENCY MEDICINE | Facility: HOSPITAL | Age: 30
End: 2025-02-28
Payer: MEDICAID

## 2025-02-28 VITALS
WEIGHT: 175 LBS | RESPIRATION RATE: 14 BRPM | DIASTOLIC BLOOD PRESSURE: 68 MMHG | BODY MASS INDEX: 29.12 KG/M2 | TEMPERATURE: 99 F | SYSTOLIC BLOOD PRESSURE: 115 MMHG | HEART RATE: 89 BPM | OXYGEN SATURATION: 100 %

## 2025-02-28 DIAGNOSIS — G97.1 POST LUMBAR PUNCTURE HEADACHE: Primary | ICD-10-CM

## 2025-02-28 LAB
ALBUMIN SERPL BCP-MCNC: 4 G/DL (ref 3.5–5.2)
ALP SERPL-CCNC: 65 U/L (ref 40–150)
ALT SERPL W/O P-5'-P-CCNC: 16 U/L (ref 10–44)
ANION GAP SERPL CALC-SCNC: 7 MMOL/L (ref 8–16)
AST SERPL-CCNC: 11 U/L (ref 10–40)
B-HCG UR QL: NEGATIVE
BASOPHILS # BLD AUTO: 0.02 K/UL (ref 0–0.2)
BASOPHILS NFR BLD: 0.3 % (ref 0–1.9)
BILIRUB SERPL-MCNC: 0.8 MG/DL (ref 0.1–1)
BILIRUB UR QL STRIP: NEGATIVE
BUN SERPL-MCNC: 7 MG/DL (ref 6–20)
CALCIUM SERPL-MCNC: 9.4 MG/DL (ref 8.7–10.5)
CHLORIDE SERPL-SCNC: 106 MMOL/L (ref 95–110)
CLARITY UR: CLEAR
CO2 SERPL-SCNC: 24 MMOL/L (ref 23–29)
COLOR UR: YELLOW
CREAT SERPL-MCNC: 0.8 MG/DL (ref 0.5–1.4)
DIFFERENTIAL METHOD BLD: ABNORMAL
EOSINOPHIL # BLD AUTO: 0.1 K/UL (ref 0–0.5)
EOSINOPHIL NFR BLD: 0.7 % (ref 0–8)
ERYTHROCYTE [DISTWIDTH] IN BLOOD BY AUTOMATED COUNT: 12.5 % (ref 11.5–14.5)
EST. GFR  (NO RACE VARIABLE): >60 ML/MIN/1.73 M^2
GLUCOSE SERPL-MCNC: 119 MG/DL (ref 70–110)
GLUCOSE UR QL STRIP: NEGATIVE
HCT VFR BLD AUTO: 41.6 % (ref 37–48.5)
HGB BLD-MCNC: 13.1 G/DL (ref 12–16)
HGB UR QL STRIP: NEGATIVE
IMM GRANULOCYTES # BLD AUTO: 0.02 K/UL (ref 0–0.04)
IMM GRANULOCYTES NFR BLD AUTO: 0.3 % (ref 0–0.5)
KETONES UR QL STRIP: NEGATIVE
LEUKOCYTE ESTERASE UR QL STRIP: NEGATIVE
LYMPHOCYTES # BLD AUTO: 1.7 K/UL (ref 1–4.8)
LYMPHOCYTES NFR BLD: 23.8 % (ref 18–48)
MCH RBC QN AUTO: 25.9 PG (ref 27–31)
MCHC RBC AUTO-ENTMCNC: 31.5 G/DL (ref 32–36)
MCV RBC AUTO: 82 FL (ref 82–98)
MONOCYTES # BLD AUTO: 0.5 K/UL (ref 0.3–1)
MONOCYTES NFR BLD: 6.4 % (ref 4–15)
NEUTROPHILS # BLD AUTO: 4.9 K/UL (ref 1.8–7.7)
NEUTROPHILS NFR BLD: 68.5 % (ref 38–73)
NITRITE UR QL STRIP: NEGATIVE
NRBC BLD-RTO: 0 /100 WBC
PH UR STRIP: 8 [PH] (ref 5–8)
PLATELET # BLD AUTO: 194 K/UL (ref 150–450)
PMV BLD AUTO: 12.4 FL (ref 9.2–12.9)
POTASSIUM SERPL-SCNC: 4.1 MMOL/L (ref 3.5–5.1)
PROT SERPL-MCNC: 7.6 G/DL (ref 6–8.4)
PROT UR QL STRIP: ABNORMAL
RBC # BLD AUTO: 5.06 M/UL (ref 4–5.4)
SODIUM SERPL-SCNC: 137 MMOL/L (ref 136–145)
SP GR UR STRIP: 1.02 (ref 1–1.03)
URN SPEC COLLECT METH UR: ABNORMAL
UROBILINOGEN UR STRIP-ACNC: NEGATIVE EU/DL
VDRL CSF QL: NEGATIVE
WBC # BLD AUTO: 7.14 K/UL (ref 3.9–12.7)

## 2025-02-28 PROCEDURE — 96374 THER/PROPH/DIAG INJ IV PUSH: CPT

## 2025-02-28 PROCEDURE — 27800516 HC TRAY, EPIDURAL COMBO: Performed by: ANESTHESIOLOGY

## 2025-02-28 PROCEDURE — 96375 TX/PRO/DX INJ NEW DRUG ADDON: CPT

## 2025-02-28 PROCEDURE — 99284 EMERGENCY DEPT VISIT MOD MDM: CPT | Mod: 25

## 2025-02-28 PROCEDURE — 85025 COMPLETE CBC W/AUTO DIFF WBC: CPT | Performed by: NURSE PRACTITIONER

## 2025-02-28 PROCEDURE — 63600175 PHARM REV CODE 636 W HCPCS: Performed by: NURSE PRACTITIONER

## 2025-02-28 PROCEDURE — 81003 URINALYSIS AUTO W/O SCOPE: CPT | Performed by: NURSE PRACTITIONER

## 2025-02-28 PROCEDURE — 81025 URINE PREGNANCY TEST: CPT | Performed by: NURSE PRACTITIONER

## 2025-02-28 PROCEDURE — 62273 INJECT EPIDURAL PATCH: CPT | Performed by: NURSE ANESTHETIST, CERTIFIED REGISTERED

## 2025-02-28 PROCEDURE — 80053 COMPREHEN METABOLIC PANEL: CPT | Performed by: NURSE PRACTITIONER

## 2025-02-28 RX ORDER — MORPHINE SULFATE 4 MG/ML
4 INJECTION, SOLUTION INTRAMUSCULAR; INTRAVENOUS
Refills: 0 | Status: COMPLETED | OUTPATIENT
Start: 2025-02-28 | End: 2025-02-28

## 2025-02-28 RX ORDER — ONDANSETRON HYDROCHLORIDE 2 MG/ML
4 INJECTION, SOLUTION INTRAVENOUS
Status: COMPLETED | OUTPATIENT
Start: 2025-02-28 | End: 2025-02-28

## 2025-02-28 RX ORDER — HYDROCODONE BITARTRATE AND ACETAMINOPHEN 5; 325 MG/1; MG/1
1 TABLET ORAL EVERY 6 HOURS PRN
Qty: 12 TABLET | Refills: 0 | Status: SHIPPED | OUTPATIENT
Start: 2025-02-28

## 2025-02-28 RX ADMIN — MORPHINE SULFATE 4 MG: 4 INJECTION INTRAVENOUS at 12:02

## 2025-02-28 RX ADMIN — ONDANSETRON 4 MG: 2 INJECTION INTRAMUSCULAR; INTRAVENOUS at 12:02

## 2025-02-28 NOTE — Clinical Note
"Madelyn Hernandezly" Lauren was seen and treated in our emergency department on 2/28/2025.  She may return to work on 03/02/2025.  Pt. Is to be on bed rest for 24-48 hours      If you have any questions or concerns, please don't hesitate to call.      Tenisha Ramos RN    "

## 2025-02-28 NOTE — ANESTHESIA POSTPROCEDURE EVALUATION
Anesthesia Post Evaluation    Patient: Madeyln Aguilar    Procedure(s) Performed: * No procedures listed *    Final Anesthesia Type: epidural (Blood patch)      Patient location during evaluation: ED  Patient participation: Yes- Able to Participate  Level of consciousness: awake and alert  Post-procedure vital signs: reviewed and stable  Pain management: adequate  Airway patency: patent    PONV status at discharge: No PONV  Anesthetic complications: no      Cardiovascular status: blood pressure returned to baseline  Respiratory status: unassisted and spontaneous ventilation  Hydration status: euvolemic  Follow-up not needed.  Comments: Pt tolerated well. Post op instructions given to the doctor, nurse and pt.               Vitals Value Taken Time   /69 02/28/25 11:34   Temp 37 °C (98.6 °F) 02/28/25 11:34   Pulse 107 02/28/25 13:55   Resp 14 02/28/25 12:50   SpO2 100 % 02/28/25 13:55   Vitals shown include unfiled device data.      No case tracking events are documented in the log.      Pain/Shemar Score: Pain Rating Prior to Med Admin: 10 (2/28/2025 12:50 PM)

## 2025-02-28 NOTE — TELEPHONE ENCOUNTER
Advised pt to present to ED per Dr. Quinonez for further evaluation of h/a post L&P. Pt states that she gets dizzy when she stands and and she feels a lot of pressure in her head. Advised pt to not drive herself to hospital after discussing her symptoms. Pt states that she will contact her sister in-law to drive her to ED. Dr. Quinonez informed via secure chat. Per Dr. Quinonez, pt may need a blood patch and he will consult with ED providers.

## 2025-02-28 NOTE — FIRST PROVIDER EVALUATION
Medical screening examination initiated.  I have conducted a focused provider triage encounter, findings are as follows:    Brief history of present illness:  Pt. C/o headache after lumbar puncture 2 days ago sent by 's office.     Vitals:    02/28/25 1134   BP: 116/69   BP Location: Right arm   Pulse: 79   Resp: 16   Temp: 98.6 °F (37 °C)   TempSrc: Oral   SpO2: 100%   Weight: 79.4 kg (175 lb)       Pertinent physical exam:  uncomfortable    Brief workup plan:  labs     Preliminary workup initiated; this workup will be continued and followed by the physician or advanced practice provider that is assigned to the patient when roomed.

## 2025-02-28 NOTE — ED PROVIDER NOTES
SCRIBE #1 NOTE: IJudi, am scribing for, and in the presence of, Maxim Stoddard MD. I have scribed the entire note.       History     Chief Complaint   Patient presents with    Headache     Pt had a lumbar puncture at this facility two days ago.  Pt began to have a headache the same day.  He doctor told her today to go to the ED for a blood patch.     Review of patient's allergies indicates:   Allergen Reactions    Fish containing products Anaphylaxis    Sudafed [pseudoephedrine hcl] Other (See Comments)     Tight knot on her neck         History of Present Illness     HPI    2025, 11:56 AM  History obtained from the patient and medical records      History of Present Illness: Madelyn Aguilar is a 29 y.o. female patient with a PMHx of GERD, anemia, HSV, and arthritis who presents to the Emergency Department for evaluation of frontal HA which began shortly after receiving a lumbar puncture 2 days ago; Dr. Quinonez recommended that she come back today for a blood patch. Symptoms are constant and moderate in severity. Pt notes that sitting upright and standing up will make her HA worse. Associated sxs include dizziness and n/v. Patient denies any weakness, confusion, numbness, rash or swelling on back or vision changes at this time. No prior Tx specified.  No further complaints or concerns at this time.       Arrival mode: Personal Transportation    PCP: No, Primary Doctor        Past Medical History:  Past Medical History:   Diagnosis Date    Abnormal Pap smear of cervix     Anemia     Arthritis     pau arms/legs    GERD (gastroesophageal reflux disease)     Herpes simplex virus (HSV) infection     Pelvic pain 2019    Syphilis, congenital        Past Surgical History:  Past Surgical History:   Procedure Laterality Date     SECTION  2013    HYSTERECTOMY      LOOP ELECTROSURGICAL EXCISION PROCEDURE (LEEP)  2019    hsil on colpo    ROBOT-ASSISTED LAPAROSCOPIC ABDOMINAL  HYSTERECTOMY USING DA SULEMA XI N/A 7/31/2019    Procedure: XI ROBOTIC HYSTERECTOMY;  Surgeon: Norma Chen MD;  Location: [facility] OR;  Service: OB/GYN;  Laterality: N/A;    ROBOT-ASSISTED SURGICAL REMOVAL OF FALLOPIAN TUBE USING DA SULEMA XI Bilateral 7/31/2019    Procedure: XI ROBOTIC SALPINGECTOMY;  Surgeon: Norma Chen MD;  Location: [facility] OR;  Service: OB/GYN;  Laterality: Bilateral;         Family History:  Family History   Problem Relation Name Age of Onset    Hypertension Mother      Stroke Mother      Heart attack Mother      Cancer Mother          bile duct cancer    Heart murmur Father         Social History:  Social History     Tobacco Use    Smoking status: Never    Smokeless tobacco: Never   Substance and Sexual Activity    Alcohol use: No    Drug use: No    Sexual activity: Yes     Partners: Male     Birth control/protection: Surgical     Comment: hyst        Review of Systems     Review of Systems   Constitutional:  Negative for fever.   HENT:  Negative for sore throat.    Eyes:  Negative for visual disturbance.   Respiratory:  Negative for shortness of breath.    Cardiovascular:  Negative for chest pain.   Gastrointestinal:  Positive for nausea and vomiting.   Genitourinary:  Negative for dysuria.   Musculoskeletal:  Negative for back pain.   Skin:  Negative for rash (or swelling on back).   Neurological:  Positive for dizziness and headaches (frontal). Negative for weakness.   Hematological:  Does not bruise/bleed easily.   All other systems reviewed and are negative.     Physical Exam     Initial Vitals [02/28/25 1134]   BP Pulse Resp Temp SpO2   116/69 79 16 98.6 °F (37 °C) 100 %      MAP       --          Physical Exam  Nursing Notes and Vital Signs Reviewed.  Constitutional: Patient is in mild distress. Well-developed and well-nourished.  Head: Atraumatic. Normocephalic.  Eyes: PERRL. EOM intact. Conjunctivae are not pale. No scleral icterus.  ENT: Mucous membranes are moist. Oropharynx is  clear and symmetric.    Neck: Supple. Full ROM. No lymphadenopathy.  Cardiovascular: Regular rate. Regular rhythm. No murmurs, rubs, or gallops. Distal pulses are 2+ and symmetric.  Pulmonary/Chest: No respiratory distress. Clear to auscultation bilaterally. No wheezing or rales.  Abdominal: Soft and non-distended. There is no tenderness.  No rebound, guarding, or rigidity. Good bowel sounds.  Genitourinary: No CVA tenderness.  Musculoskeletal: Moves all extremities. No obvious deformities. No edema. No calf tenderness.  Skin: Warm and dry.  Neurological:  Alert, awake, and appropriate.  Normal speech.  No acute focal neurological deficits are appreciated.  Psychiatric: Normal affect. Good eye contact. Appropriate in content.     ED Course   Procedures  ED Vital Signs:  Vitals:    02/28/25 1134 02/28/25 1250 02/28/25 1715   BP: 116/69  115/68   Pulse: 79  89   Resp: 16 14 14   Temp: 98.6 °F (37 °C)  98.5 °F (36.9 °C)   TempSrc: Oral     SpO2: 100%  100%   Weight: 79.4 kg (175 lb)         Abnormal Lab Results:  Labs Reviewed   CBC W/ AUTO DIFFERENTIAL - Abnormal       Result Value    WBC 7.14      RBC 5.06      Hemoglobin 13.1      Hematocrit 41.6      MCV 82      MCH 25.9 (*)     MCHC 31.5 (*)     RDW 12.5      Platelets 194      MPV 12.4      Immature Granulocytes 0.3      Gran # (ANC) 4.9      Immature Grans (Abs) 0.02      Lymph # 1.7      Mono # 0.5      Eos # 0.1      Baso # 0.02      nRBC 0      Gran % 68.5      Lymph % 23.8      Mono % 6.4      Eosinophil % 0.7      Basophil % 0.3      Differential Method Automated     COMPREHENSIVE METABOLIC PANEL - Abnormal    Sodium 137      Potassium 4.1      Chloride 106      CO2 24      Glucose 119 (*)     BUN 7      Creatinine 0.8      Calcium 9.4      Total Protein 7.6      Albumin 4.0      Total Bilirubin 0.8      Alkaline Phosphatase 65      AST 11      ALT 16      eGFR >60      Anion Gap 7 (*)    URINALYSIS, REFLEX TO URINE CULTURE - Abnormal    Specimen UA Urine,  Clean Catch      Color, UA Yellow      Appearance, UA Clear      pH, UA 8.0      Specific Gravity, UA 1.025      Protein, UA Trace (*)     Glucose, UA Negative      Ketones, UA Negative      Bilirubin (UA) Negative      Occult Blood UA Negative      Nitrite, UA Negative      Urobilinogen, UA Negative      Leukocytes, UA Negative      Narrative:     Specimen Source->Urine   PREGNANCY TEST, URINE RAPID    Preg Test, Ur Negative      Narrative:     Specimen Source->Urine        All Lab Results:  Results for orders placed or performed during the hospital encounter of 02/28/25   CBC auto differential    Collection Time: 02/28/25 12:42 PM   Result Value Ref Range    WBC 7.14 3.90 - 12.70 K/uL    RBC 5.06 4.00 - 5.40 M/uL    Hemoglobin 13.1 12.0 - 16.0 g/dL    Hematocrit 41.6 37.0 - 48.5 %    MCV 82 82 - 98 fL    MCH 25.9 (L) 27.0 - 31.0 pg    MCHC 31.5 (L) 32.0 - 36.0 g/dL    RDW 12.5 11.5 - 14.5 %    Platelets 194 150 - 450 K/uL    MPV 12.4 9.2 - 12.9 fL    Immature Granulocytes 0.3 0.0 - 0.5 %    Gran # (ANC) 4.9 1.8 - 7.7 K/uL    Immature Grans (Abs) 0.02 0.00 - 0.04 K/uL    Lymph # 1.7 1.0 - 4.8 K/uL    Mono # 0.5 0.3 - 1.0 K/uL    Eos # 0.1 0.0 - 0.5 K/uL    Baso # 0.02 0.00 - 0.20 K/uL    nRBC 0 0 /100 WBC    Gran % 68.5 38.0 - 73.0 %    Lymph % 23.8 18.0 - 48.0 %    Mono % 6.4 4.0 - 15.0 %    Eosinophil % 0.7 0.0 - 8.0 %    Basophil % 0.3 0.0 - 1.9 %    Differential Method Automated    Comprehensive metabolic panel    Collection Time: 02/28/25 12:42 PM   Result Value Ref Range    Sodium 137 136 - 145 mmol/L    Potassium 4.1 3.5 - 5.1 mmol/L    Chloride 106 95 - 110 mmol/L    CO2 24 23 - 29 mmol/L    Glucose 119 (H) 70 - 110 mg/dL    BUN 7 6 - 20 mg/dL    Creatinine 0.8 0.5 - 1.4 mg/dL    Calcium 9.4 8.7 - 10.5 mg/dL    Total Protein 7.6 6.0 - 8.4 g/dL    Albumin 4.0 3.5 - 5.2 g/dL    Total Bilirubin 0.8 0.1 - 1.0 mg/dL    Alkaline Phosphatase 65 40 - 150 U/L    AST 11 10 - 40 U/L    ALT 16 10 - 44 U/L    eGFR >60  >60 mL/min/1.73 m^2    Anion Gap 7 (L) 8 - 16 mmol/L   Urinalysis, Reflex to Urine Culture Urine, Clean Catch    Collection Time: 02/28/25  2:28 PM    Specimen: Urine, Clean Catch   Result Value Ref Range    Specimen UA Urine, Clean Catch     Color, UA Yellow Yellow, Straw, Karlie    Appearance, UA Clear Clear    pH, UA 8.0 5.0 - 8.0    Specific Gravity, UA 1.025 1.005 - 1.030    Protein, UA Trace (A) Negative    Glucose, UA Negative Negative    Ketones, UA Negative Negative    Bilirubin (UA) Negative Negative    Occult Blood UA Negative Negative    Nitrite, UA Negative Negative    Urobilinogen, UA Negative <2.0 EU/dL    Leukocytes, UA Negative Negative   Pregnancy, urine rapid    Collection Time: 02/28/25  2:28 PM   Result Value Ref Range    Preg Test, Ur Negative        Imaging Results:  Imaging Results    None                 The Emergency Provider reviewed the vital signs and test results, which are outlined above.     ED Discussion         ED Course as of 03/07/25 1503   Fri Feb 28, 2025   1318 Discussed with CRNA. Lay on back for 2 hours, and bed rest for 24-48 hours. Strong pain meds for a few days.  [BA]   1422 Blood patch completed, will need to lay on back for 2 hours.  [BA]      ED Course User Index  [BA] Maxim Stoddard MD     Medical Decision Making  Amount and/or Complexity of Data Reviewed  Labs: ordered. Decision-making details documented in ED Course.  Discussion of management or test interpretation with external provider(s): 1:21 PM: Discussed pt's case with Darek Rodas CRNA (Anesthesiology) who recommends bed rest for 24-48 hours and strong pain medication for HA.    1:22 PM: Discussed pt's case with Cristiano (Infectious diseases) who recommends follow-up outpatient.      2:25 PM: Reassessed pt at this time. Discussed with patient and/or family/caretaker all pertinent ED information and results. Discussed pt dx and plan of tx. Gave the patient all f/u and return to the ED instructions. All  questions and concerns were addressed at this time. Patient and/or family/caretaker expresses understanding of information and instructions, and is comfortable with plan to discharge. Pt is stable for discharge.     I discussed with patient and/or family/caretaker that evaluation in the ED does not suggest any emergent or life threatening medical conditions requiring immediate intervention beyond what was provided in the ED, and I believe patient is safe for discharge.  Regardless, an unremarkable evaluation in the ED does not preclude the development or presence of a serious of life threatening condition. As such, I instructed that the patient is to return immediately for any worsening or change in current symptoms.      Risk  Prescription drug management.  Risk Details: Differential diagnoses:  Tension headache, migraine, cluster headache, cerebral aneurysms, dissection syndrome, herpes simplex encephalitis, intracranial hemorrhage, temporal or giant cell arteritis, viral meningitis                  ED Medication(s):  Medications   morphine injection 4 mg (4 mg Intravenous Given 2/28/25 1250)   ondansetron injection 4 mg (4 mg Intravenous Given 2/28/25 1249)       Discharge Medication List as of 2/28/2025  2:24 PM        START taking these medications    Details   HYDROcodone-acetaminophen (NORCO) 5-325 mg per tablet Take 1 tablet by mouth every 6 (six) hours as needed for Pain., Starting Fri 2/28/2025, Normal              Follow-up Information       Your Primary Care Provider. Schedule an appointment as soon as possible for a visit in 2 days.    Why: For re-evaluation and further treatment             [facility] - Emergency Dept.. Go today.    Specialty: Emergency Medicine  Why: If symptoms worsen, For re-evaluation and further treatment, As needed  Contact information:  [id number] [facility] [address] [phone]                               Scribe Attestation:   Scribe #1: I  performed the above scribed service and the documentation accurately describes the services I performed. I attest to the accuracy of the note.     Attending:   Physician Attestation Statement for Scribe #1: I, Maxim Stoddard MD, personally performed the services described in this documentation, as scribed by Judi Jeff, in my presence, and it is both accurate and complete.           Clinical Impression       ICD-10-CM ICD-9-CM   1. Post lumbar puncture headache  G97.1 349.0       Disposition:   Disposition: Discharged  Condition: Stable       Maxim Stoddard MD  03/07/25 1505

## 2025-02-28 NOTE — ANESTHESIA PROCEDURE NOTES
Epidural    Patient location during procedure: ED  Block not for primary anesthetic.  Reason for block: post dural puncture headache   Post-op Pain Location: POST DURAL PUNCTURE HEADACHE  Start time: 2/28/2025 1:47 PM  Timeout: 2/28/2025 1:46 PM  End time: 2/28/2025 2:20 PM    Staffing  Performing Provider: Darek Rodas Jr. CRNA  Authorizing Provider: Darek Lopez II, MD    Staffing  Performed by: Darek Rodas Jr., CRNA  Authorized by: Darek Lopez II, MD        Preanesthetic Checklist  Completed: patient identified, IV checked, risks and benefits discussed, timeout performed, anesthesia consent given and hand hygiene performed  Preparation  Patient position: sitting  Prep: ChloraPrep Block not for primary anesthetic.  Epidural  Skin Anesthetic: lidocaine 1%  Skin Wheal: 3 mL  Administration type: single shot  Approach: midline  Interspace: L4-5    Injection technique: MARIA air  Needle and Epidural Catheter  Needle type: Tuohy   Needle gauge: 17  Needle length: 3.5 inches  Needle insertion depth: 7 cm  Insertion Attempts: 1  Additional Documentation: negative aspiration for heme and CSF, no paresthesia on injection, no significant pain on injection and no significant complaints from patient  Needle localization: anatomical landmarks  Medications:  Volume per aspiration: 0 mL  Time between aspirations: 2 minutes   Assessment  Ease of block: easy  Patient's tolerance of the procedure: no complaints  Additional Notes  BLOOD OBTAINED UNDER STERILE TECHNIQUE FROM left ARM with ultrasound    VOLUME OF AUTOLOGOUS BLOOD INJECTED: 20cc    POST PROCEDURE INSTRUCTIONS GIVEN TO PT AS FOLLOWS...     RETURN TO ED FOR RETURN OF HEADACHE, NAUSEA, VOMITING, FEVER, DIFFICULTY WITH AMBULATION OR DIFFICULTY WITH BLADDER OR BOWEL FUNCTION. Pt instructed to remain flat for 2 hours before discharge. Then 24-48 hrs of bedrest with no straining, bending or lifting.   Pt tolerated well  No inadvertent dural puncture  with Tuohy.  Dural puncture not performed with spinal needle

## 2025-02-28 NOTE — ANESTHESIA PREPROCEDURE EVALUATION
02/28/2025  Madelyn Aguilar is a 29 y.o., female.  S/P LP x2days ago. Presented to the ER with s/s of spinal headache    Consulted for blood patch     past Medical History:   Diagnosis Date    Abnormal Pap smear of cervix      Anemia      Arthritis       pau arms/legs    GERD (gastroesophageal reflux disease)      Herpes simplex virus (HSV) infection      Pelvic pain 7/31/2019    Syphilis, congenital        Pre-op Assessment    I have reviewed the Patient Summary Reports.     I have reviewed the Nursing Notes.    I have reviewed the Medications.     Review of Systems  Anesthesia Hx:  No previous Anesthesia                Social:  Non-Smoker       Hematology/Oncology:  Hematology Normal   Oncology Normal                                   EENT/Dental:  EENT/Dental Normal           Cardiovascular:  Exercise tolerance: good                NYHA Classification I                             Pulmonary:  Pulmonary Normal                       Renal/:  Renal/ Normal                 Hepatic/GI:     GERD                Musculoskeletal:  Musculoskeletal Normal                Neurological:  Neurology Normal          Spinal headache related to LP                            Endocrine:  Endocrine Normal            Dermatological:  Skin Normal    Psych:  Psychiatric Normal                    Physical Exam  General: Well nourished and Cooperative    Airway:  Mallampati: II   Mouth Opening: Normal  Tongue: Normal  Neck ROM: Normal ROM    Chest/Lungs:  Clear to auscultation, Normal Respiratory Rate    Heart:  Rate: Normal  Rhythm: Regular Rhythm        Anesthesia Plan  Type of Anesthesia, risks & benefits discussed:    Anesthesia Type: Epidural  Intra-op Monitoring Plan: Standard ASA Monitors  Post Op Pain Control Plan: multimodal analgesia  Airway Plan: , Post-Induction  Informed Consent: Informed consent signed  with the Patient and all parties understand the risks and agree with anesthesia plan.  All questions answered. Patient consented to blood products? Yes  ASA Score: 2  Day of Surgery Review of History & Physical: H&P Update referred to the surgeon/provider.    Ready For Surgery From Anesthesia Perspective.     .

## 2025-03-07 ENCOUNTER — PATIENT MESSAGE (OUTPATIENT)
Dept: INFECTIOUS DISEASES | Facility: CLINIC | Age: 30
End: 2025-03-07
Payer: MEDICAID

## 2025-09-05 ENCOUNTER — E-VISIT (OUTPATIENT)
Dept: FAMILY MEDICINE | Facility: CLINIC | Age: 30
End: 2025-09-05
Payer: MEDICAID

## 2025-09-05 ENCOUNTER — PATIENT MESSAGE (OUTPATIENT)
Dept: INFECTIOUS DISEASES | Facility: CLINIC | Age: 30
End: 2025-09-05
Payer: MEDICAID

## 2025-09-05 DIAGNOSIS — Z11.3 SCREENING EXAMINATION FOR STD (SEXUALLY TRANSMITTED DISEASE): ICD-10-CM

## 2025-09-05 DIAGNOSIS — A50.9 SYPHILIS, CONGENITAL: Primary | ICD-10-CM

## 2025-09-05 DIAGNOSIS — N76.0 ACUTE VAGINITIS: Primary | ICD-10-CM

## (undated) DEVICE — SEE MEDLINE ITEM 154981

## (undated) DEVICE — SOL ELECTROLUBE ANTI-STIC

## (undated) DEVICE — SEE MEDLINE ITEM 157117

## (undated) DEVICE — DRAPE STERI LONG

## (undated) DEVICE — UNDERGLOVES BIOGEL PI SZ 7 LF

## (undated) DEVICE — SEE MEDLINE ITEM 157027

## (undated) DEVICE — COVER TIP CURVED SCISSORS XI

## (undated) DEVICE — APPLICATOR CHLORAPREP ORN 26ML

## (undated) DEVICE — DRAPE ARM DAVINCI XI

## (undated) DEVICE — SUT CTD VICRYL PLUS 4/0

## (undated) DEVICE — SYR 50CC LL

## (undated) DEVICE — DRAPE LAVH LAPAROSCOPY W/FLUID

## (undated) DEVICE — OBTURATOR BLADELESS 8MM XI CLR

## (undated) DEVICE — SYR 3CC LUER LOC

## (undated) DEVICE — TROCAR ENDOPATH XCEL 5X100MM

## (undated) DEVICE — SUT CTD VICRYL 0 UND BR SUT

## (undated) DEVICE — EVACUATOR KIT SMOKE PLUME AWAY

## (undated) DEVICE — SUT MONOCRYL 4-0 PS-1 UND

## (undated) DEVICE — POSITIONER HEAD DONUT 9IN FOAM

## (undated) DEVICE — Device

## (undated) DEVICE — ADHESIVE DERMABOND ADVANCED

## (undated) DEVICE — SEE MEDLINE ITEM 152622

## (undated) DEVICE — SEAL UNIVERSAL 5MM-8MM XI

## (undated) DEVICE — SOL NS 1000CC

## (undated) DEVICE — KIT ANTIFOG

## (undated) DEVICE — SUPPORT ULNA NERVE PROTECTOR

## (undated) DEVICE — GLOVE SURGICAL LATEX SZ 7

## (undated) DEVICE — TIP RUMI GREEN DISPOSABLE6.7MM

## (undated) DEVICE — SYR 10CC LUER LOCK

## (undated) DEVICE — IRRIGATOR ENDOSCOPY DISP.

## (undated) DEVICE — SUT VICRYL PLUS 0 CT1 36IN

## (undated) DEVICE — GLOVE BIOGEL PI MICRO SZ 6.5

## (undated) DEVICE — NDL PNEUMO INSUFFLATI 120MM

## (undated) DEVICE — SEE MEDLINE ITEM 157181

## (undated) DEVICE — OCCLUDER COLPO-PNEUMO STERILE

## (undated) DEVICE — SOL 9P NACL IRR PIC IL

## (undated) DEVICE — COVER OVERHEAD SURG LT BLUE

## (undated) DEVICE — DRAPE COLUMN DAVINCI XI

## (undated) DEVICE — ELECTRODE REM PLYHSV RETURN 9

## (undated) DEVICE — SEE MEDLINE ITEM 146292